# Patient Record
Sex: FEMALE | Race: OTHER | URBAN - METROPOLITAN AREA
[De-identification: names, ages, dates, MRNs, and addresses within clinical notes are randomized per-mention and may not be internally consistent; named-entity substitution may affect disease eponyms.]

---

## 2021-07-17 ENCOUNTER — INPATIENT (INPATIENT)
Facility: HOSPITAL | Age: 31
LOS: 3 days | Discharge: HOME HEALTH | End: 2021-07-21
Attending: HOSPITALIST | Admitting: HOSPITALIST
Payer: COMMERCIAL

## 2021-07-17 VITALS
SYSTOLIC BLOOD PRESSURE: 105 MMHG | OXYGEN SATURATION: 92 % | DIASTOLIC BLOOD PRESSURE: 57 MMHG | HEART RATE: 106 BPM | TEMPERATURE: 100 F | RESPIRATION RATE: 20 BRPM

## 2021-07-17 LAB
ALBUMIN SERPL ELPH-MCNC: 3.7 G/DL — SIGNIFICANT CHANGE UP (ref 3.5–5.2)
ALP SERPL-CCNC: 69 U/L — SIGNIFICANT CHANGE UP (ref 30–115)
ALT FLD-CCNC: 149 U/L — HIGH (ref 0–41)
ANION GAP SERPL CALC-SCNC: 16 MMOL/L — HIGH (ref 7–14)
AST SERPL-CCNC: 167 U/L — HIGH (ref 0–41)
BASE EXCESS BLDV CALC-SCNC: 5.4 MMOL/L — HIGH (ref -2–2)
BASOPHILS # BLD AUTO: 0.02 K/UL — SIGNIFICANT CHANGE UP (ref 0–0.2)
BASOPHILS NFR BLD AUTO: 0.2 % — SIGNIFICANT CHANGE UP (ref 0–1)
BILIRUB SERPL-MCNC: 0.4 MG/DL — SIGNIFICANT CHANGE UP (ref 0.2–1.2)
BUN SERPL-MCNC: 9 MG/DL — LOW (ref 10–20)
CA-I SERPL-SCNC: 1.15 MMOL/L — SIGNIFICANT CHANGE UP (ref 1.12–1.3)
CALCIUM SERPL-MCNC: 8.7 MG/DL — SIGNIFICANT CHANGE UP (ref 8.5–10.1)
CHLORIDE SERPL-SCNC: 94 MMOL/L — LOW (ref 98–110)
CO2 SERPL-SCNC: 23 MMOL/L — SIGNIFICANT CHANGE UP (ref 17–32)
CREAT SERPL-MCNC: 0.7 MG/DL — SIGNIFICANT CHANGE UP (ref 0.7–1.5)
D DIMER BLD IA.RAPID-MCNC: 436 NG/ML DDU — HIGH (ref 0–230)
EOSINOPHIL # BLD AUTO: 0 K/UL — SIGNIFICANT CHANGE UP (ref 0–0.7)
EOSINOPHIL NFR BLD AUTO: 0 % — SIGNIFICANT CHANGE UP (ref 0–8)
GAS PNL BLDV: 136 MMOL/L — SIGNIFICANT CHANGE UP (ref 136–145)
GAS PNL BLDV: SIGNIFICANT CHANGE UP
GLUCOSE SERPL-MCNC: 100 MG/DL — HIGH (ref 70–99)
HCO3 BLDV-SCNC: 30 MMOL/L — HIGH (ref 22–29)
HCT VFR BLD CALC: 38.2 % — SIGNIFICANT CHANGE UP (ref 37–47)
HCT VFR BLDA CALC: 37.9 % — SIGNIFICANT CHANGE UP (ref 34–44)
HGB BLD CALC-MCNC: 12.4 G/DL — LOW (ref 14–18)
HGB BLD-MCNC: 12.7 G/DL — SIGNIFICANT CHANGE UP (ref 12–16)
IMM GRANULOCYTES NFR BLD AUTO: 0.7 % — HIGH (ref 0.1–0.3)
LACTATE BLDV-MCNC: 1.3 MMOL/L — SIGNIFICANT CHANGE UP (ref 0.5–1.6)
LYMPHOCYTES # BLD AUTO: 1.04 K/UL — LOW (ref 1.2–3.4)
LYMPHOCYTES # BLD AUTO: 11.5 % — LOW (ref 20.5–51.1)
MCHC RBC-ENTMCNC: 28.2 PG — SIGNIFICANT CHANGE UP (ref 27–31)
MCHC RBC-ENTMCNC: 33.2 G/DL — SIGNIFICANT CHANGE UP (ref 32–37)
MCV RBC AUTO: 84.7 FL — SIGNIFICANT CHANGE UP (ref 81–99)
MONOCYTES # BLD AUTO: 0.6 K/UL — SIGNIFICANT CHANGE UP (ref 0.1–0.6)
MONOCYTES NFR BLD AUTO: 6.6 % — SIGNIFICANT CHANGE UP (ref 1.7–9.3)
NEUTROPHILS # BLD AUTO: 7.35 K/UL — HIGH (ref 1.4–6.5)
NEUTROPHILS NFR BLD AUTO: 81 % — HIGH (ref 42.2–75.2)
NRBC # BLD: 0 /100 WBCS — SIGNIFICANT CHANGE UP (ref 0–0)
PCO2 BLDV: 45 MMHG — SIGNIFICANT CHANGE UP (ref 41–51)
PH BLDV: 7.44 — HIGH (ref 7.26–7.43)
PLATELET # BLD AUTO: 275 K/UL — SIGNIFICANT CHANGE UP (ref 130–400)
PO2 BLDV: 20 MMHG — SIGNIFICANT CHANGE UP (ref 20–40)
POTASSIUM BLDV-SCNC: 4 MMOL/L — SIGNIFICANT CHANGE UP (ref 3.3–5.6)
POTASSIUM SERPL-MCNC: 4.5 MMOL/L — SIGNIFICANT CHANGE UP (ref 3.5–5)
POTASSIUM SERPL-SCNC: 4.5 MMOL/L — SIGNIFICANT CHANGE UP (ref 3.5–5)
PROT SERPL-MCNC: 6.6 G/DL — SIGNIFICANT CHANGE UP (ref 6–8)
RBC # BLD: 4.51 M/UL — SIGNIFICANT CHANGE UP (ref 4.2–5.4)
RBC # FLD: 13.6 % — SIGNIFICANT CHANGE UP (ref 11.5–14.5)
SAO2 % BLDV: 31 % — SIGNIFICANT CHANGE UP
SARS-COV-2 RNA SPEC QL NAA+PROBE: DETECTED
SODIUM SERPL-SCNC: 133 MMOL/L — LOW (ref 135–146)
TROPONIN T SERPL-MCNC: <0.01 NG/ML — SIGNIFICANT CHANGE UP
WBC # BLD: 9.07 K/UL — SIGNIFICANT CHANGE UP (ref 4.8–10.8)
WBC # FLD AUTO: 9.07 K/UL — SIGNIFICANT CHANGE UP (ref 4.8–10.8)

## 2021-07-17 PROCEDURE — 99406 BEHAV CHNG SMOKING 3-10 MIN: CPT

## 2021-07-17 PROCEDURE — 93010 ELECTROCARDIOGRAM REPORT: CPT

## 2021-07-17 PROCEDURE — 99285 EMERGENCY DEPT VISIT HI MDM: CPT

## 2021-07-17 PROCEDURE — 99223 1ST HOSP IP/OBS HIGH 75: CPT | Mod: 25

## 2021-07-17 PROCEDURE — 71045 X-RAY EXAM CHEST 1 VIEW: CPT | Mod: 26

## 2021-07-17 RX ORDER — SODIUM CHLORIDE 9 MG/ML
1000 INJECTION, SOLUTION INTRAVENOUS ONCE
Refills: 0 | Status: COMPLETED | OUTPATIENT
Start: 2021-07-17 | End: 2021-07-17

## 2021-07-17 RX ORDER — DEXAMETHASONE 0.5 MG/5ML
6 ELIXIR ORAL ONCE
Refills: 0 | Status: COMPLETED | OUTPATIENT
Start: 2021-07-17 | End: 2021-07-17

## 2021-07-17 RX ADMIN — Medication 6 MILLIGRAM(S): at 14:41

## 2021-07-17 RX ADMIN — SODIUM CHLORIDE 1000 MILLILITER(S): 9 INJECTION, SOLUTION INTRAVENOUS at 14:41

## 2021-07-17 NOTE — ED PROVIDER NOTE - ATTENDING CONTRIBUTION TO CARE
29 yo F with PMH presents to ED for worsening SOB. Pt states she started to feel ill on Sunday 7/12- cough, congestion and fever. she tested + COVID on Tuesday 7/13. She came to the ED today for worsening SOB. Pt not vaccinated for COVID.     Const: Well nourished, well developed, appears stated age  Eyes: PERRL, no conjunctival injection  HENT:  Neck supple without meningismus   CV: RRR, Warm, well-perfused extremities  RESP: CTA B/L, no tachypnea   GI: soft, non-tender, non-distended  MSK: No gross deformities appreciated  Skin: Warm, dry. No rashes  Neuro: Alert, CNs II-XII grossly intact. Sensation and motor function of extremities grossly intact.  Psych: Appropriate mood and affect.    Pt hypoxic to 90% on ambulation. Will do labs, CXR, and admit for hypoxia due to COVID

## 2021-07-17 NOTE — H&P ADULT - NSHPPHYSICALEXAM_GEN_ALL_CORE
CONSTITUTIONAL: No acute distress, well-developed, well-groomed, AAOx3  HEAD: Atraumatic, normocephalic  EYES: EOM intact, PERRLA, conjunctiva and sclera clear  ENT: Supple, no masses, no thyromegaly, no bruits, no JVD; moist mucous membranes  PULMONARY: Crackles b/l   CARDIOVASCULAR: Regular rate and rhythm; no murmurs, rubs, or gallops  GASTROINTESTINAL: Soft, non-tender, non-distended; bowel sounds present  MUSCULOSKELETAL: 2+ peripheral pulses; no clubbing, no cyanosis, no edema  NEUROLOGY: non-focal  SKIN: No rashes or lesions; warm and dry

## 2021-07-17 NOTE — H&P ADULT - HISTORY OF PRESENT ILLNESS
29 YO F with PMH of HTN, COVID-19 +ve (07/13/2021) who presents to the hospital with a c/c of SOB for the past x 6 days. She also reports associated fevers. Denies any runny nose, sore throat, rashes, CP, palpitations, LE swelling, N/V/D, ABD pain, and dysuria, sick contacts, recent travel. She did not get the COVID-19 Vaccine.  In the ED, Chest X-ray with b/l airspace opacities. Hypoxic to 90% on RA, placed on NC. COVID-19 PCR positive

## 2021-07-17 NOTE — H&P ADULT - NSHPLABSRESULTS_GEN_ALL_CORE
VITAL SIGNS: Last 24 Hours  T(C): 36.2 (17 Jul 2021 21:50), Max: 38.1 (17 Jul 2021 16:14)  T(F): 97.1 (17 Jul 2021 21:50), Max: 100.5 (17 Jul 2021 16:14)  HR: 86 (17 Jul 2021 21:50) (86 - 106)  BP: 106/70 (17 Jul 2021 21:50) (105/57 - 110/66)  BP(mean): --  RR: 18 (17 Jul 2021 21:50) (18 - 20)  SpO2: 95% (17 Jul 2021 22:28) (92% - 97%)    LABS:                        12.7   9.07  )-----------( 275      ( 17 Jul 2021 14:47 )             38.2     07-17    133<L>  |  94<L>  |  9<L>  ----------------------------<  100<H>  4.5   |  23  |  0.7    Ca    8.7      17 Jul 2021 14:47    TPro  6.6  /  Alb  3.7  /  TBili  0.4  /  DBili  x   /  AST  167<H>  /  ALT  149<H>  /  AlkPhos  69  07-17          Troponin T, Serum: <0.01 ng/mL (07-17-21 @ 14:47)      CARDIAC MARKERS ( 17 Jul 2021 14:47 )  x     / <0.01 ng/mL / x     / x     / x

## 2021-07-17 NOTE — ED PROVIDER NOTE - OBJECTIVE STATEMENT
30yF pmhx HTN c/o SOB x6days; constant, worsening, exacerbated by exertion; associated w/ cough, congestion, as well as fevers tmax 104; states she tested covid+ 4 days ago and sx have been getting worse. Pt is a smoker.

## 2021-07-17 NOTE — ED PROVIDER NOTE - CLINICAL SUMMARY MEDICAL DECISION MAKING FREE TEXT BOX
29 yo f presented to ED for SOB due COVID. pt hypoxic. given decadron in ED. Pt admitted for further management.

## 2021-07-17 NOTE — ED PROVIDER NOTE - NS ED ROS FT
Review of Systems:  	•	CONSTITUTIONAL - +fever, no diaphoresis  	•	SKIN - no rash, no lesions  	•	HEMATOLOGIC - no bleeding, no bruising  	•	EYES - no discharge, no injection  	•	ENT - no sore throat, +runny nose  	•	RESPIRATORY - +shortness of breath, +cough  	•	CARDIAC - no chest pain, no palpitations  	•	GI - no abd pain, no nausea, no vomiting, no diarrhea  	•	GENITO-URINARY - no dysuria, no hematuria  	•	MUSCULOSKELETAL - no joint pain, no muscle aches  	•	NEUROLOGIC - no dizziness, no headache

## 2021-07-17 NOTE — ED ADULT NURSE NOTE - NSIMPLEMENTINTERV_GEN_ALL_ED
Implemented All Universal Safety Interventions:  [place] to call system. Call bell, personal items and telephone within reach. Instruct patient to call for assistance. Room bathroom lighting operational. Non-slip footwear when patient is off stretcher. Physically safe environment: no spills, clutter or unnecessary equipment. Stretcher in lowest position, wheels locked, appropriate side rails in place.

## 2021-07-17 NOTE — H&P ADULT - ASSESSMENT
#Acute hypoxemic respiratory failure likely secondary to COVID-19 PNA  #Sepsis present on admission  - COVID-19 PCR positive. Continue isolation  - X-ray chest showing b/l airspace opacities  - Rule out bacterial PNA. Follow procalcitonin. Will hold off on the antibiotics for now. Low suspicion   - Dexamethasone 6mg IV QD for 10 days  - Remdesivir 200 mg x 1 and then 100 mg for 4 days  - ID evaluation for plasma. Will send type and screen and COVID-19 antibodies   - Follow inflammatory markers including ferritin and CRP. Repeat Q48  - D-dimer 436. Lovenox 40mg SubQ BID   - Monitor pulse Ox. Keep > 93%. Supplement as needed. Check pulse ox on ambulation   - Supportive care including antipyretics, antitussives and analgesics.    SOB + Fevers due to COVID19 pneumonia + acute hypoxic respiratory failure, sepsis present on admission (HR> 90 + fever + source + end organ [AHRF]). - recent travel. - COVID19 contact. Admit to [facility] isolation unit. Send Coags, CRP, procal, D-dimer, and LDH. Trend CBC, Ck, and LFTs. Send ferritin and fibrinogen. FU official Chest XR report. IV Steroids. Remdesivir/Plasma protocol. Plasma Abs. B/L LE duplex. APAP PRN. Anti-tussives PRN. Supplemental O2 PRN. Prone pt as tolerated. AC as per [facility] COVID protocol. ID Consult.     Lymphocytopenia and transaminitis, from above. Management as above.     Tobacco abuse with counseling. Pt extensively counseled on the benefits of smoking cessation and alternative therapies. Counseled for 15-20 minutes. Pt would like to think about their options prior to making a decision.             #Misc  - DVT Prophylaxis: Lovenox   - Diet: DASH/TLC  - GI Prophylaxis: Pantoprazole   - Activity: AAT  - Code Status: Full Code    Dispo: Continue to monitor  29 YO F with PMH of HTN, COVID-19 +ve (07/13/2021) who presents to the hospital with a c/c of SOB for the past x 6 days.    #Acute hypoxemic respiratory failure likely secondary to COVID-19 PNA  #Sepsis present on admission  - COVID-19 PCR positive. Continue isolation  - X-ray chest showing b/l airspace opacities  - Rule out bacterial PNA. Follow procalcitonin. Will hold off on the antibiotics for now. Low suspicion   - Dexamethasone 6mg IV QD for 10 days  - Remdesivir 200 mg x 1 and then 100 mg for 4 days  - ID evaluation for plasma. Will send type and screen and COVID-19 antibodies   - Follow inflammatory markers including ferritin and CRP. Repeat Q48  - D-dimer 436. Lovenox 40mg SubQ BID. LE Duplex   - Monitor pulse Ox. Keep > 93%. Supplement as needed. Check pulse ox on ambulation   - Supportive care including antipyretics, antitussives and analgesics.    #Active smoker  - Counselled on smoking cessation     #Misc  - DVT Prophylaxis: Lovenox   - Diet: DASH/TLC  - GI Prophylaxis: Pantoprazole   - Activity: AAT  - Code Status: Full Code    Dispo: Continue to monitor

## 2021-07-17 NOTE — H&P ADULT - ATTENDING COMMENTS
29 YO F with a PMH of HTN and COVID19+ (7/13/21) who presents to the hospital with a c/o SOB for the past x 6 days. Associated with fevers. Denies any runny nose, sore throat, rashes, CP, palpitations, LE swelling, N/V/D, ABD pain, and dysuria. - sick contacts. - recent travel. - COVID Vaccine.    In the ED, Chest X-ray with B/L airspace opacities (pending official read). Hypoxic to 90% on RA, placed on NC. Isolated and COVID19 swab was positive.     Physical exam shows pt in NAD. VSS, afebrile, not hypoxic on 3L NC. A&Ox3. Non-focal neuro exam. Muscle strength/sensation intact. CTA B/L with no W/C/R. RRR, no M/G/R. ABD is soft and non-tender, normoactive BSs. LEs without swelling. No rashes. Labs and radiology as above.     SOB + Fevers due to COVID19 pneumonia + acute hypoxic respiratory failure, sepsis present on admission (HR> 90 + fever + source + end organ [AHRF]). - recent travel. - COVID19 contact. Admit to COVID19 isolation unit. Send Coags, CRP, procal, D-dimer, and LDH. Trend CBC, Ck, and LFTs. Send ferritin and fibrinogen. FU official Chest XR report. IV Steroids. Remdesivir/Plasma protocol. Plasma Abs. B/L LE duplex. APAP PRN. Anti-tussives PRN. Supplemental O2 PRN. Prone pt as tolerated. AC as per [facility] COVID protocol. ID Consult.     Lymphocytopenia and transaminitis, from above. Management as above.     Hx of HTN. Restart home meds, except as stated above. DVT PPX. Inform PCP of pt's admission to hospital. My note supersedes the residents note. 29 YO F with a PMH of HTN and COVID19+ (7/13/21) who presents to the hospital with a c/o SOB for the past x 6 days. Associated with fevers. Denies any runny nose, sore throat, rashes, CP, palpitations, LE swelling, N/V/D, ABD pain, and dysuria. - sick contacts. - recent travel. - COVID Vaccine.    In the ED, Chest X-ray with B/L airspace opacities (pending official read). Hypoxic to 90% on RA, placed on NC. Isolated and COVID19 swab was positive.     Physical exam shows pt in NAD. VSS, afebrile, not hypoxic on 3L NC. A&Ox3. Non-focal neuro exam. Muscle strength/sensation intact. CTA B/L with no W/C/R. RRR, no M/G/R. ABD is soft and non-tender, normoactive BSs. LEs without swelling. No rashes. Labs and radiology as above.     SOB + Fevers due to COVID19 pneumonia + acute hypoxic respiratory failure, sepsis present on admission (HR> 90 + fever + source + end organ [AHRF]). - recent travel. - COVID19 contact. Admit to COVID19 isolation unit. Send Coags, CRP, procal, D-dimer, and LDH. Trend CBC, Ck, and LFTs. Send ferritin and fibrinogen. FU official Chest XR report. IV Steroids. Remdesivir/Plasma protocol. Plasma Abs. B/L LE duplex. APAP PRN. Anti-tussives PRN. Supplemental O2 PRN. Prone pt as tolerated. AC as per [facility] COVID protocol. ID Consult.     Lymphocytopenia and transaminitis, from above. Management as above.     Tobacco abuse with counseling. Pt extensively counseled on the benefits of smoking cessation and alternative therapies. Counseled for 15-20 minutes. Pt would like to think about their options prior to making a decision.     Hx of HTN. Restart home meds, except as stated above. DVT PPX. Inform PCP of pt's admission to hospital. My note supersedes the residents note.

## 2021-07-18 LAB
ALBUMIN SERPL ELPH-MCNC: 3.7 G/DL — SIGNIFICANT CHANGE UP (ref 3.5–5.2)
ALP SERPL-CCNC: 71 U/L — SIGNIFICANT CHANGE UP (ref 30–115)
ALT FLD-CCNC: 183 U/L — HIGH (ref 0–41)
ANION GAP SERPL CALC-SCNC: 11 MMOL/L — SIGNIFICANT CHANGE UP (ref 7–14)
AST SERPL-CCNC: 141 U/L — HIGH (ref 0–41)
BASOPHILS # BLD AUTO: 0 K/UL — SIGNIFICANT CHANGE UP (ref 0–0.2)
BASOPHILS NFR BLD AUTO: 0 % — SIGNIFICANT CHANGE UP (ref 0–1)
BILIRUB SERPL-MCNC: 0.3 MG/DL — SIGNIFICANT CHANGE UP (ref 0.2–1.2)
BLD GP AB SCN SERPL QL: SIGNIFICANT CHANGE UP
BUN SERPL-MCNC: 11 MG/DL — SIGNIFICANT CHANGE UP (ref 10–20)
CALCIUM SERPL-MCNC: 8.7 MG/DL — SIGNIFICANT CHANGE UP (ref 8.5–10.1)
CHLORIDE SERPL-SCNC: 99 MMOL/L — SIGNIFICANT CHANGE UP (ref 98–110)
CO2 SERPL-SCNC: 27 MMOL/L — SIGNIFICANT CHANGE UP (ref 17–32)
CREAT SERPL-MCNC: 0.5 MG/DL — LOW (ref 0.7–1.5)
CRP SERPL-MCNC: 194 MG/L — HIGH
EOSINOPHIL # BLD AUTO: 0 K/UL — SIGNIFICANT CHANGE UP (ref 0–0.7)
EOSINOPHIL NFR BLD AUTO: 0 % — SIGNIFICANT CHANGE UP (ref 0–8)
FERRITIN SERPL-MCNC: 1241 NG/ML — HIGH (ref 15–150)
GLUCOSE SERPL-MCNC: 119 MG/DL — HIGH (ref 70–99)
HCT VFR BLD CALC: 39 % — SIGNIFICANT CHANGE UP (ref 37–47)
HGB BLD-MCNC: 12.9 G/DL — SIGNIFICANT CHANGE UP (ref 12–16)
IMM GRANULOCYTES NFR BLD AUTO: 0.7 % — HIGH (ref 0.1–0.3)
LDH SERPL L TO P-CCNC: 566 — HIGH (ref 50–242)
LYMPHOCYTES # BLD AUTO: 0.61 K/UL — LOW (ref 1.2–3.4)
LYMPHOCYTES # BLD AUTO: 11.2 % — LOW (ref 20.5–51.1)
MAGNESIUM SERPL-MCNC: 2.5 MG/DL — HIGH (ref 1.8–2.4)
MCHC RBC-ENTMCNC: 28.3 PG — SIGNIFICANT CHANGE UP (ref 27–31)
MCHC RBC-ENTMCNC: 33.1 G/DL — SIGNIFICANT CHANGE UP (ref 32–37)
MCV RBC AUTO: 85.5 FL — SIGNIFICANT CHANGE UP (ref 81–99)
MONOCYTES # BLD AUTO: 0.46 K/UL — SIGNIFICANT CHANGE UP (ref 0.1–0.6)
MONOCYTES NFR BLD AUTO: 8.5 % — SIGNIFICANT CHANGE UP (ref 1.7–9.3)
NEUTROPHILS # BLD AUTO: 4.33 K/UL — SIGNIFICANT CHANGE UP (ref 1.4–6.5)
NEUTROPHILS NFR BLD AUTO: 79.6 % — HIGH (ref 42.2–75.2)
NRBC # BLD: 0 /100 WBCS — SIGNIFICANT CHANGE UP (ref 0–0)
PHOSPHATE SERPL-MCNC: 3.7 MG/DL — SIGNIFICANT CHANGE UP (ref 2.1–4.9)
PLATELET # BLD AUTO: 305 K/UL — SIGNIFICANT CHANGE UP (ref 130–400)
POTASSIUM SERPL-MCNC: 4.7 MMOL/L — SIGNIFICANT CHANGE UP (ref 3.5–5)
POTASSIUM SERPL-SCNC: 4.7 MMOL/L — SIGNIFICANT CHANGE UP (ref 3.5–5)
PROCALCITONIN SERPL-MCNC: 0.2 NG/ML — HIGH (ref 0.02–0.1)
PROT SERPL-MCNC: 6.6 G/DL — SIGNIFICANT CHANGE UP (ref 6–8)
RBC # BLD: 4.56 M/UL — SIGNIFICANT CHANGE UP (ref 4.2–5.4)
RBC # FLD: 13.9 % — SIGNIFICANT CHANGE UP (ref 11.5–14.5)
SODIUM SERPL-SCNC: 137 MMOL/L — SIGNIFICANT CHANGE UP (ref 135–146)
WBC # BLD: 5.44 K/UL — SIGNIFICANT CHANGE UP (ref 4.8–10.8)
WBC # FLD AUTO: 5.44 K/UL — SIGNIFICANT CHANGE UP (ref 4.8–10.8)

## 2021-07-18 PROCEDURE — 99233 SBSQ HOSP IP/OBS HIGH 50: CPT

## 2021-07-18 PROCEDURE — 93970 EXTREMITY STUDY: CPT | Mod: 26

## 2021-07-18 RX ORDER — REMDESIVIR 5 MG/ML
100 INJECTION INTRAVENOUS EVERY 24 HOURS
Refills: 0 | Status: DISCONTINUED | OUTPATIENT
Start: 2021-07-19 | End: 2021-07-21

## 2021-07-18 RX ORDER — DEXAMETHASONE 0.5 MG/5ML
6 ELIXIR ORAL DAILY
Refills: 0 | Status: DISCONTINUED | OUTPATIENT
Start: 2021-07-18 | End: 2021-07-21

## 2021-07-18 RX ORDER — REMDESIVIR 5 MG/ML
INJECTION INTRAVENOUS
Refills: 0 | Status: DISCONTINUED | OUTPATIENT
Start: 2021-07-18 | End: 2021-07-21

## 2021-07-18 RX ORDER — REMDESIVIR 5 MG/ML
200 INJECTION INTRAVENOUS EVERY 24 HOURS
Refills: 0 | Status: COMPLETED | OUTPATIENT
Start: 2021-07-18 | End: 2021-07-18

## 2021-07-18 RX ORDER — PANTOPRAZOLE SODIUM 20 MG/1
40 TABLET, DELAYED RELEASE ORAL
Refills: 0 | Status: DISCONTINUED | OUTPATIENT
Start: 2021-07-18 | End: 2021-07-21

## 2021-07-18 RX ORDER — ENOXAPARIN SODIUM 100 MG/ML
40 INJECTION SUBCUTANEOUS
Refills: 0 | Status: DISCONTINUED | OUTPATIENT
Start: 2021-07-18 | End: 2021-07-21

## 2021-07-18 RX ORDER — ACETAMINOPHEN 500 MG
650 TABLET ORAL EVERY 6 HOURS
Refills: 0 | Status: DISCONTINUED | OUTPATIENT
Start: 2021-07-18 | End: 2021-07-21

## 2021-07-18 RX ADMIN — REMDESIVIR 200 MILLIGRAM(S): 5 INJECTION INTRAVENOUS at 17:52

## 2021-07-18 RX ADMIN — Medication 6 MILLIGRAM(S): at 05:44

## 2021-07-18 RX ADMIN — ENOXAPARIN SODIUM 40 MILLIGRAM(S): 100 INJECTION SUBCUTANEOUS at 17:52

## 2021-07-18 NOTE — CONSULT NOTE ADULT - ASSESSMENT
31 YO F with PMH of HTN, COVID-19 +ve (07/13/2021) who presents to the hospital with a c/c of SOB for the past x 6 days. She also reports associated fevers. Denies any runny nose, sore throat, rashes, CP, palpitations, LE swelling, N/V/D, ABD pain, and dysuria, sick contacts, recent travel. She did not get the COVID-19 Vaccine.    IMPRESSION;  COVID 19 with severe illness. SpO2 < 94% on RA and need for supplemental O2.  Pt is in the early viral replicative phase based on the timeline/onset of symptoms.  Inflammatory markers are elevated and suggestive of a cytokine response/cytokine storm.  procalcitonin 0.20  , not suggestive of a bacterial PNA.  Ferritin 1241    Ddimers 436    RECOMMENDATIONS;  No role for plasma /Toci  Target SpO2 92 % to 96 %  Day 1 : Single  mg IV loading dose  Day 2-5 : single  mg IV once daily maintenance dose  Daily CBC,CMP.  Dexamethasone 6 mg iv q24h for 10 days.  Monitor for side effects: hyperglycemia, neurological ( agitation/confusion), adrenal suppression, bacterial and fungal infections  Anticoagulation as per team.

## 2021-07-18 NOTE — CONSULT NOTE ADULT - SUBJECTIVE AND OBJECTIVE BOX
CRISSY BARKLEY  30y, Female  Allergy: No Known Allergies      All historical available data reviewed.    HPI:  29 YO F with PMH of HTN, COVID-19 +ve (07/13/2021) who presents to the hospital with a c/c of SOB for the past x 6 days. She also reports associated fevers. Denies any runny nose, sore throat, rashes, CP, palpitations, LE swelling, N/V/D, ABD pain, and dysuria, sick contacts, recent travel. She did not get the COVID-19 Vaccine.  In the ED, Chest X-ray with b/l airspace opacities. Hypoxic to 90% on RA, placed on NC. COVID-19 PCR positive         (17 Jul 2021 21:04)    FAMILY HISTORY:    PAST MEDICAL & SURGICAL HISTORY:  HTN (hypertension)    No significant past surgical history          VITALS:  T(F): 97.2, Max: 100.5 (07-17-21 @ 16:14)  HR: 72  BP: 105/63  RR: 18Vital Signs Last 24 Hrs  T(C): 36.2 (18 Jul 2021 05:26), Max: 38.1 (17 Jul 2021 16:14)  T(F): 97.2 (18 Jul 2021 05:26), Max: 100.5 (17 Jul 2021 16:14)  HR: 72 (18 Jul 2021 05:26) (72 - 106)  BP: 105/63 (18 Jul 2021 05:26) (105/57 - 110/66)  BP(mean): --  RR: 18 (18 Jul 2021 05:26) (18 - 20)  SpO2: 100% (18 Jul 2021 05:26) (92% - 100%)    TESTS & MEASUREMENTS:                        12.7   9.07  )-----------( 275      ( 17 Jul 2021 14:47 )             38.2     07-17    133<L>  |  94<L>  |  9<L>  ----------------------------<  100<H>  4.5   |  23  |  0.7    Ca    8.7      17 Jul 2021 14:47    TPro  6.6  /  Alb  3.7  /  TBili  0.4  /  DBili  x   /  AST  167<H>  /  ALT  149<H>  /  AlkPhos  69  07-17    LIVER FUNCTIONS - ( 17 Jul 2021 14:47 )  Alb: 3.7 g/dL / Pro: 6.6 g/dL / ALK PHOS: 69 U/L / ALT: 149 U/L / AST: 167 U/L / GGT: x                   RADIOLOGY & ADDITIONAL TESTS:  Personal review of radiological diagnostics performed  Echo and EKG results noted when applicable.     MEDICATIONS:  acetaminophen   Tablet .. 650 milliGRAM(s) Oral every 6 hours PRN  dexAMETHasone  Injectable 6 milliGRAM(s) IV Push daily      ANTIBIOTICS:

## 2021-07-19 LAB
ALBUMIN SERPL ELPH-MCNC: 3.4 G/DL — LOW (ref 3.5–5.2)
ALBUMIN SERPL ELPH-MCNC: 3.5 G/DL — SIGNIFICANT CHANGE UP (ref 3.5–5.2)
ALP SERPL-CCNC: 69 U/L — SIGNIFICANT CHANGE UP (ref 30–115)
ALP SERPL-CCNC: 70 U/L — SIGNIFICANT CHANGE UP (ref 30–115)
ALT FLD-CCNC: 178 U/L — HIGH (ref 0–41)
ALT FLD-CCNC: 181 U/L — HIGH (ref 0–41)
AST SERPL-CCNC: 81 U/L — HIGH (ref 0–41)
AST SERPL-CCNC: 82 U/L — HIGH (ref 0–41)
BILIRUB DIRECT SERPL-MCNC: <0.2 MG/DL — SIGNIFICANT CHANGE UP (ref 0–0.2)
BILIRUB DIRECT SERPL-MCNC: <0.2 MG/DL — SIGNIFICANT CHANGE UP (ref 0–0.2)
BILIRUB INDIRECT FLD-MCNC: >0.1 MG/DL — LOW (ref 0.2–1.2)
BILIRUB INDIRECT FLD-MCNC: >0.1 MG/DL — LOW (ref 0.2–1.2)
BILIRUB SERPL-MCNC: 0.3 MG/DL — SIGNIFICANT CHANGE UP (ref 0.2–1.2)
BILIRUB SERPL-MCNC: 0.3 MG/DL — SIGNIFICANT CHANGE UP (ref 0.2–1.2)
COVID-19 SPIKE DOMAIN AB INTERP: POSITIVE
COVID-19 SPIKE DOMAIN ANTIBODY RESULT: 5 U/ML — HIGH
CREAT SERPL-MCNC: 0.5 MG/DL — LOW (ref 0.7–1.5)
CREAT SERPL-MCNC: 0.6 MG/DL — LOW (ref 0.7–1.5)
CRP SERPL-MCNC: 160 MG/L — HIGH
FERRITIN SERPL-MCNC: 1158 NG/ML — HIGH (ref 15–150)
HCT VFR BLD CALC: 39.9 % — SIGNIFICANT CHANGE UP (ref 37–47)
HGB BLD-MCNC: 12.8 G/DL — SIGNIFICANT CHANGE UP (ref 12–16)
INR BLD: 1.18 RATIO — SIGNIFICANT CHANGE UP (ref 0.65–1.3)
MCHC RBC-ENTMCNC: 27.7 PG — SIGNIFICANT CHANGE UP (ref 27–31)
MCHC RBC-ENTMCNC: 32.1 G/DL — SIGNIFICANT CHANGE UP (ref 32–37)
MCV RBC AUTO: 86.4 FL — SIGNIFICANT CHANGE UP (ref 81–99)
NRBC # BLD: 0 /100 WBCS — SIGNIFICANT CHANGE UP (ref 0–0)
PLATELET # BLD AUTO: 443 K/UL — HIGH (ref 130–400)
PROCALCITONIN SERPL-MCNC: 0.11 NG/ML — HIGH (ref 0.02–0.1)
PROT SERPL-MCNC: 6.4 G/DL — SIGNIFICANT CHANGE UP (ref 6–8)
PROT SERPL-MCNC: 6.4 G/DL — SIGNIFICANT CHANGE UP (ref 6–8)
PROTHROM AB SERPL-ACNC: 13.6 SEC — HIGH (ref 9.95–12.87)
RBC # BLD: 4.62 M/UL — SIGNIFICANT CHANGE UP (ref 4.2–5.4)
RBC # FLD: 13.7 % — SIGNIFICANT CHANGE UP (ref 11.5–14.5)
SARS-COV-2 IGG+IGM SERPL QL IA: 5 U/ML — HIGH
SARS-COV-2 IGG+IGM SERPL QL IA: POSITIVE
WBC # BLD: 8.52 K/UL — SIGNIFICANT CHANGE UP (ref 4.8–10.8)
WBC # FLD AUTO: 8.52 K/UL — SIGNIFICANT CHANGE UP (ref 4.8–10.8)

## 2021-07-19 PROCEDURE — 99233 SBSQ HOSP IP/OBS HIGH 50: CPT

## 2021-07-19 RX ADMIN — ENOXAPARIN SODIUM 40 MILLIGRAM(S): 100 INJECTION SUBCUTANEOUS at 05:08

## 2021-07-19 RX ADMIN — PANTOPRAZOLE SODIUM 40 MILLIGRAM(S): 20 TABLET, DELAYED RELEASE ORAL at 05:09

## 2021-07-19 RX ADMIN — Medication 6 MILLIGRAM(S): at 05:09

## 2021-07-19 RX ADMIN — ENOXAPARIN SODIUM 40 MILLIGRAM(S): 100 INJECTION SUBCUTANEOUS at 17:14

## 2021-07-19 RX ADMIN — Medication 650 MILLIGRAM(S): at 05:51

## 2021-07-19 RX ADMIN — REMDESIVIR 500 MILLIGRAM(S): 5 INJECTION INTRAVENOUS at 16:34

## 2021-07-19 RX ADMIN — Medication 650 MILLIGRAM(S): at 05:21

## 2021-07-19 NOTE — PROGRESS NOTE ADULT - ATTENDING COMMENTS
repeat inflammatory markers in AM  continue RDV and steroids  wean o2 as tolerated # severe COVID pneumonia  repeat inflammatory markers in AM  continue RDV and steroids  wean o2 as tolerated    # transaminitis due to COVId infection  monitor levels

## 2021-07-19 NOTE — PROGRESS NOTE ADULT - SUBJECTIVE AND OBJECTIVE BOX
CRISSY BARKLEY 30y Female  MRN#: [id number]   CODE STATUS:________    Hospital Day: 2d    Pt is currently admitted with the primary diagnosis of     SUBJECTIVE  Hospital Course    Overnight events     Subjective complaints     Present Today:   - Tyrone:  No [  ], Yes [   ] : Indication:     - Type of IV Access:       .. CVC/Piccline:  No [  ], Yes [   ] : Indication:       .. Midline: No [  ], Yes [   ] : Indication:                                             ----------------------------------------------------------  OBJECTIVE  PAST MEDICAL & SURGICAL HISTORY  HTN (hypertension)    No significant past surgical history                                              -----------------------------------------------------------  ALLERGIES:  No Known Allergies                                            ------------------------------------------------------------    HOME MEDICATIONS  Home Medications:                           MEDICATIONS:  STANDING MEDICATIONS  dexAMETHasone  Injectable 6 milliGRAM(s) IV Push daily  enoxaparin Injectable 40 milliGRAM(s) SubCutaneous two times a day  pantoprazole    Tablet 40 milliGRAM(s) Oral before breakfast  remdesivir  IVPB 100 milliGRAM(s) IV Intermittent every 24 hours  remdesivir  IVPB   IV Intermittent     PRN MEDICATIONS  acetaminophen   Tablet .. 650 milliGRAM(s) Oral every 6 hours PRN                                            ------------------------------------------------------------  VITAL SIGNS: Last 24 Hours  T(C): 35.6 (19 Jul 2021 05:26), Max: 35.9 (18 Jul 2021 12:40)  T(F): 96 (19 Jul 2021 05:26), Max: 96.7 (18 Jul 2021 12:40)  HR: 67 (19 Jul 2021 05:26) (67 - 86)  BP: 93/54 (19 Jul 2021 05:26) (93/54 - 103/68)  BP(mean): --  RR: 18 (19 Jul 2021 05:39) (16 - 18)  SpO2: 92% (19 Jul 2021 08:24) (88% - 92%)                                             --------------------------------------------------------------  LABS:                        12.9   5.44  )-----------( 305      ( 18 Jul 2021 07:48 )             39.0     07-18    137  |  99  |  11  ----------------------------<  119<H>  4.7   |  27  |  0.5<L>    Ca    8.7      18 Jul 2021 07:48  Phos  3.7     07-18  Mg     2.5     07-18    TPro  6.6  /  Alb  3.7  /  TBili  0.3  /  DBili  x   /  AST  141<H>  /  ALT  183<H>  /  AlkPhos  71  07-18                  CARDIAC MARKERS ( 17 Jul 2021 14:47 )  x     / <0.01 ng/mL / x     / x     / x                                                  -------------------------------------------------------------  RADIOLOGY:                                            --------------------------------------------------------------    PHYSICAL EXAM:  General:   HEENT:  LUNGS:  HEART:  ABDOMEN:  EXT:  NEURO:  SKIN:                                           --------------------------------------------------------------    ASSESSMENT & PLAN    Past medical history and hospital course                                                                                                           ----------------------------------------------------  # DVT prophylaxis     # GI prophylaxis     # Diet     # Activity Score (AM-PAC)    # Code status     # Disposition                                                                              --------------------------------------------------------    # Handoff      CRISSY BARKLEY 30y Female  MRN#: [id number]   CODE STATUS: Full    Hospital Day: 2d    Pt is currently admitted with the primary diagnosis of COVID-19 hypoxia.    SUBJECTIVE  Hospital Course:     Overnight events: No events reported, patient reports feeling much better, would like to go home.    Subjective complaints: Endorses SOB, cough, denies chest pain, fever, chills.    Present Today:   - Hansen:  No [ x ], Yes [   ] : Indication:     - Type of IV Access:       .. CVC/Piccline:  No [ x ], Yes [   ] : Indication:       .. Midline: No [ x ], Yes [   ] : Indication:                                             ----------------------------------------------------------  OBJECTIVE  PAST MEDICAL & SURGICAL HISTORY  HTN (hypertension)    No significant past surgical history                                              -----------------------------------------------------------  ALLERGIES:  No Known Allergies                                            ------------------------------------------------------------    HOME MEDICATIONS  Home Medications:                           MEDICATIONS:  STANDING MEDICATIONS  dexAMETHasone  Injectable 6 milliGRAM(s) IV Push daily  enoxaparin Injectable 40 milliGRAM(s) SubCutaneous two times a day  pantoprazole    Tablet 40 milliGRAM(s) Oral before breakfast  remdesivir  IVPB 100 milliGRAM(s) IV Intermittent every 24 hours  remdesivir  IVPB   IV Intermittent     PRN MEDICATIONS  acetaminophen   Tablet .. 650 milliGRAM(s) Oral every 6 hours PRN                                            ------------------------------------------------------------  VITAL SIGNS: Last 24 Hours  T(C): 35.6 (19 Jul 2021 05:26), Max: 35.9 (18 Jul 2021 12:40)  T(F): 96 (19 Jul 2021 05:26), Max: 96.7 (18 Jul 2021 12:40)  HR: 67 (19 Jul 2021 05:26) (67 - 86)  BP: 93/54 (19 Jul 2021 05:26) (93/54 - 103/68)  BP(mean): --  RR: 18 (19 Jul 2021 05:39) (16 - 18)  SpO2: 92% (19 Jul 2021 08:24) (88% - 92%)                                             --------------------------------------------------------------  LABS:                        12.9   5.44  )-----------( 305      ( 18 Jul 2021 07:48 )             39.0     07-18    137  |  99  |  11  ----------------------------<  119<H>  4.7   |  27  |  0.5<L>    Ca    8.7      18 Jul 2021 07:48  Phos  3.7     07-18  Mg     2.5     07-18    TPro  6.6  /  Alb  3.7  /  TBili  0.3  /  DBili  x   /  AST  141<H>  /  ALT  183<H>  /  AlkPhos  71  07-18                  CARDIAC MARKERS ( 17 Jul 2021 14:47 )  x     / <0.01 ng/mL / x     / x     / x                                                  -------------------------------------------------------------  RADIOLOGY:                                            --------------------------------------------------------------    PHYSICAL EXAM:  General: resting comfortably, no acute distress  HEENT: EOMI, atraumatic  LUNGS: clear to auscultation bilaterally, normal respiratory rate  HEART: regular rate/rhythm, no murmurs/gallops  ABDOMEN: soft, nontender, normoactive bowel sounds  EXT: 5/5 in upper/lower extremities bilaterally  NEURO: aaox3, no focal deficits noted  SKIN: intact, no new lesions noted.                                           --------------------------------------------------------------    ASSESSMENT & PLAN    Past medical history and hospital course                                                                                                           ----------------------------------------------------  # DVT prophylaxis     # GI prophylaxis     # Diet     # Activity Score (AM-PAC)    # Code status     # Disposition                                                                              --------------------------------------------------------    # Handoff      CRISSY BARKLEY 30y Female  MRN#: [id number]   CODE STATUS: Full    Hospital Day: 2d    Pt is currently admitted with the primary diagnosis of COVID-19 hypoxia.    SUBJECTIVE  Hospital Course:     Overnight events: No events reported, patient reports feeling much better, would like to go home.    Subjective complaints: Endorses SOB, cough, denies chest pain, fever, chills.    Present Today:   - Hansen:  No [ x ], Yes [   ] : Indication:     - Type of IV Access:       .. CVC/Piccline:  No [ x ], Yes [   ] : Indication:       .. Midline: No [ x ], Yes [   ] : Indication:                                             ----------------------------------------------------------  OBJECTIVE  PAST MEDICAL & SURGICAL HISTORY  HTN (hypertension)    No significant past surgical history                                              -----------------------------------------------------------  ALLERGIES:  No Known Allergies                                            ------------------------------------------------------------    HOME MEDICATIONS  Home Medications:                           MEDICATIONS:  STANDING MEDICATIONS  dexAMETHasone  Injectable 6 milliGRAM(s) IV Push daily  enoxaparin Injectable 40 milliGRAM(s) SubCutaneous two times a day  pantoprazole    Tablet 40 milliGRAM(s) Oral before breakfast  remdesivir  IVPB 100 milliGRAM(s) IV Intermittent every 24 hours  remdesivir  IVPB   IV Intermittent     PRN MEDICATIONS  acetaminophen   Tablet .. 650 milliGRAM(s) Oral every 6 hours PRN                                            ------------------------------------------------------------  VITAL SIGNS: Last 24 Hours  T(C): 35.6 (19 Jul 2021 05:26), Max: 35.9 (18 Jul 2021 12:40)  T(F): 96 (19 Jul 2021 05:26), Max: 96.7 (18 Jul 2021 12:40)  HR: 67 (19 Jul 2021 05:26) (67 - 86)  BP: 93/54 (19 Jul 2021 05:26) (93/54 - 103/68)  BP(mean): --  RR: 18 (19 Jul 2021 05:39) (16 - 18)  SpO2: 92% (19 Jul 2021 08:24) (88% - 92%)                                             --------------------------------------------------------------  LABS:                        12.9   5.44  )-----------( 305      ( 18 Jul 2021 07:48 )             39.0     07-18    137  |  99  |  11  ----------------------------<  119<H>  4.7   |  27  |  0.5<L>    Ca    8.7      18 Jul 2021 07:48  Phos  3.7     07-18  Mg     2.5     07-18    TPro  6.6  /  Alb  3.7  /  TBili  0.3  /  DBili  x   /  AST  141<H>  /  ALT  183<H>  /  AlkPhos  71  07-18                  CARDIAC MARKERS ( 17 Jul 2021 14:47 )  x     / <0.01 ng/mL / x     / x     / x                                                  -------------------------------------------------------------  RADIOLOGY:                                            --------------------------------------------------------------    PHYSICAL EXAM:  General: resting comfortably, no acute distress  HEENT: EOMI, atraumatic  LUNGS: clear to auscultation bilaterally, normal respiratory rate  HEART: regular rate/rhythm, no murmurs/gallops  ABDOMEN: soft, nontender, normoactive bowel sounds  EXT: 5/5 in upper/lower extremities bilaterally  NEURO: aaox3, no focal deficits noted  SKIN: intact, no new lesions noted.                                           --------------------------------------------------------------    ASSESSMENT & PLAN  Past medical history and hospital course   #Acute hypoxemic respiratory failure likely secondary to COVID-19 PNA  - patient reports improvement in sx over weekend  - IV remdesivir 100mg qdaily for 4 doses, starting 7/19  - Dexamethasone IV push 6mg qdaily, 7d, starting 7/19  - Tylenol PRN  - 4L O2 NC, satting 92-93  - ambulating w/o difficulty currently  - Ferritin 1241, , Ddimers 436  - monitor hepatic, renal function, coag panel    #Active smoker  -  on cessation, f/u with PCP outpatient    # obesity; BMI >30  -  on weight loss, f/u with PCP outpatient                                                                              ----------------------------------------------------  # DVT prophylaxis: Lovenox BID (BMI >30)    # GI prophylaxis: Protonix    # Diet    # Activity Score: AAT    # Code status: Full    # Disposition: Remain on Floor                                                                           --------------------------------------------------------    # Handoff: Monitor sx, continue remdesivir/steroids, ID following, wean off O2 as tolerated, encourage ambulation as tolerated.     CRISSY BARKLEY 30y Female  MRN#: [id number]   CODE STATUS: Full    Hospital Day: 2d    Pt is currently admitted with the primary diagnosis of COVID-19 hypoxia.    SUBJECTIVE  Hospital Course:     Overnight events: No events reported, patient reports feeling much better, would like to go home.    Subjective complaints: Endorses SOB, cough, denies chest pain, fever, chills.    Present Today:   - Hansen:  No [ x ], Yes [   ] : Indication:     - Type of IV Access:       .. CVC/Piccline:  No [ x ], Yes [   ] : Indication:       .. Midline: No [ x ], Yes [   ] : Indication:                                             ----------------------------------------------------------  OBJECTIVE  PAST MEDICAL & SURGICAL HISTORY  HTN (hypertension)    No significant past surgical history                                              -----------------------------------------------------------  ALLERGIES:  No Known Allergies                                            ------------------------------------------------------------    HOME MEDICATIONS  Home Medications:                           MEDICATIONS:  STANDING MEDICATIONS  dexAMETHasone  Injectable 6 milliGRAM(s) IV Push daily  enoxaparin Injectable 40 milliGRAM(s) SubCutaneous two times a day  pantoprazole    Tablet 40 milliGRAM(s) Oral before breakfast  remdesivir  IVPB 100 milliGRAM(s) IV Intermittent every 24 hours  remdesivir  IVPB   IV Intermittent     PRN MEDICATIONS  acetaminophen   Tablet .. 650 milliGRAM(s) Oral every 6 hours PRN                                            ------------------------------------------------------------  VITAL SIGNS: Last 24 Hours  T(C): 35.6 (19 Jul 2021 05:26), Max: 35.9 (18 Jul 2021 12:40)  T(F): 96 (19 Jul 2021 05:26), Max: 96.7 (18 Jul 2021 12:40)  HR: 67 (19 Jul 2021 05:26) (67 - 86)  BP: 93/54 (19 Jul 2021 05:26) (93/54 - 103/68)  BP(mean): --  RR: 18 (19 Jul 2021 05:39) (16 - 18)  SpO2: 92% (19 Jul 2021 08:24) (88% - 92%)                                             --------------------------------------------------------------  LABS:                        12.9   5.44  )-----------( 305      ( 18 Jul 2021 07:48 )             39.0     07-18    137  |  99  |  11  ----------------------------<  119<H>  4.7   |  27  |  0.5<L>    Ca    8.7      18 Jul 2021 07:48  Phos  3.7     07-18  Mg     2.5     07-18    TPro  6.6  /  Alb  3.7  /  TBili  0.3  /  DBili  x   /  AST  141<H>  /  ALT  183<H>  /  AlkPhos  71  07-18                  CARDIAC MARKERS ( 17 Jul 2021 14:47 )  x     / <0.01 ng/mL / x     / x     / x                                                  -------------------------------------------------------------  RADIOLOGY:                                            --------------------------------------------------------------    PHYSICAL EXAM:  General: resting comfortably, no acute distress  HEENT: EOMI, atraumatic  LUNGS: clear to auscultation bilaterally, normal respiratory rate  HEART: regular rate/rhythm, no murmurs/gallops  ABDOMEN: soft, nontender, normoactive bowel sounds  EXT: 5/5 in upper/lower extremities bilaterally  NEURO: aaox3, no focal deficits noted  SKIN: intact, no new lesions noted.                                           --------------------------------------------------------------    ASSESSMENT & PLAN  Past medical history and hospital course   #Acute hypoxemic respiratory failure likely secondary to COVID-19 PNA  - patient reports improvement in sx over weekend  - IV remdesivir 100mg qdaily for 4 doses, starting 7/19  - Dexamethasone IV push 6mg qdaily, 7d, starting 7/19  - Tylenol PRN  - 4L O2 NC, satting 92-93  - ambulating w/o difficulty currently  - Ferritin 1241, , Ddimers 436  - monitor hepatic, renal function, coag panel    #Active smoker  -  on cessation, f/u with PCP outpatient    # obesity; BMI >30  -  on weight loss, f/u with PCP outpatient                                                                              ----------------------------------------------------  # DVT prophylaxis: Lovenox BID (BMI >30)    # GI prophylaxis: Protonix    # Diet: DASH/TLC    # Activity Score: AAT    # Code status: Full    # Disposition: Remain on Floor                                                                           --------------------------------------------------------    # Handoff: Monitor sx, continue remdesivir/steroids, ID following, wean off O2 as tolerated, encourage ambulation as tolerated.     CRISSY BARKLEY 30y Female  MRN#: [id number]   CODE STATUS: Full    Hospital Day: 2d    Pt is currently admitted with the primary diagnosis of COVID-19 hypoxia.    SUBJECTIVE  Hospital Course:     Overnight events: No events reported, patient reports feeling much better, would like to go home.    Subjective complaints: Endorses SOB, cough, denies chest pain, fever, chills.    Present Today:   - Hansen:  No [ x ], Yes [   ] : Indication:     - Type of IV Access:       .. CVC/Piccline:  No [ x ], Yes [   ] : Indication:       .. Midline: No [ x ], Yes [   ] : Indication:                                             ----------------------------------------------------------  OBJECTIVE  PAST MEDICAL & SURGICAL HISTORY  HTN (hypertension)    No significant past surgical history                                              -----------------------------------------------------------  ALLERGIES:  No Known Allergies                                            ------------------------------------------------------------    HOME MEDICATIONS  Home Medications:                           MEDICATIONS:  STANDING MEDICATIONS  dexAMETHasone  Injectable 6 milliGRAM(s) IV Push daily  enoxaparin Injectable 40 milliGRAM(s) SubCutaneous two times a day  pantoprazole    Tablet 40 milliGRAM(s) Oral before breakfast  remdesivir  IVPB 100 milliGRAM(s) IV Intermittent every 24 hours  remdesivir  IVPB   IV Intermittent     PRN MEDICATIONS  acetaminophen   Tablet .. 650 milliGRAM(s) Oral every 6 hours PRN                                            ------------------------------------------------------------  VITAL SIGNS: Last 24 Hours  T(C): 35.6 (19 Jul 2021 05:26), Max: 35.9 (18 Jul 2021 12:40)  T(F): 96 (19 Jul 2021 05:26), Max: 96.7 (18 Jul 2021 12:40)  HR: 67 (19 Jul 2021 05:26) (67 - 86)  BP: 93/54 (19 Jul 2021 05:26) (93/54 - 103/68)  BP(mean): --  RR: 18 (19 Jul 2021 05:39) (16 - 18)  SpO2: 92% (19 Jul 2021 08:24) (88% - 92%)                                             --------------------------------------------------------------  LABS:                        12.9   5.44  )-----------( 305      ( 18 Jul 2021 07:48 )             39.0     07-18    137  |  99  |  11  ----------------------------<  119<H>  4.7   |  27  |  0.5<L>    Ca    8.7      18 Jul 2021 07:48  Phos  3.7     07-18  Mg     2.5     07-18    TPro  6.6  /  Alb  3.7  /  TBili  0.3  /  DBili  x   /  AST  141<H>  /  ALT  183<H>  /  AlkPhos  71  07-18                  CARDIAC MARKERS ( 17 Jul 2021 14:47 )  x     / <0.01 ng/mL / x     / x     / x                                                  -------------------------------------------------------------  RADIOLOGY:                                            --------------------------------------------------------------    PHYSICAL EXAM:  General: resting comfortably, no acute distress  HEENT: EOMI, atraumatic  LUNGS: clear to auscultation bilaterally, normal respiratory rate  HEART: regular rate/rhythm, no murmurs/gallops  ABDOMEN: soft, nontender, normoactive bowel sounds  EXT: 5/5 in upper/lower extremities bilaterally  NEURO: aaox3, no focal deficits noted  SKIN: intact, no new lesions noted.                                           --------------------------------------------------------------    ASSESSMENT & PLAN  Past medical history and hospital course   #Acute hypoxemic respiratory failure likely secondary to COVID-19 PNA  - patient reports improvement in sx over weekend  - IV remdesivir 100mg qdaily for 4 doses, starting 7/19  - Dexamethasone IV push 6mg qdaily, 7d, starting 7/19  - Tylenol PRN  - remains on 4L O2 NC, satting 92-93  - ambulating w/o difficulty currently  - Ferritin 1241, , Ddimers 436 on 7/18  - monitor hepatic, renal function, coag panel    #Active smoker  -  on cessation, f/u with PCP outpatient    # obesity; BMI >30  -  on weight loss, f/u with PCP outpatient                                                                              ----------------------------------------------------  # DVT prophylaxis: Lovenox BID (BMI >30)    # GI prophylaxis: Protonix    # Diet: DASH/TLC    # Activity Score: AAT    # Code status: Full    # Disposition: Remain on Floor                                                                           --------------------------------------------------------    # Handoff: Monitor sx, continue remdesivir/steroids, ID following, wean off O2 as tolerated, encourage ambulation as tolerated.

## 2021-07-20 LAB
ALBUMIN SERPL ELPH-MCNC: 3.4 G/DL — LOW (ref 3.5–5.2)
ALP SERPL-CCNC: 71 U/L — SIGNIFICANT CHANGE UP (ref 30–115)
ALT FLD-CCNC: 129 U/L — HIGH (ref 0–41)
ANION GAP SERPL CALC-SCNC: 12 MMOL/L — SIGNIFICANT CHANGE UP (ref 7–14)
APTT BLD: 30 SEC — SIGNIFICANT CHANGE UP (ref 27–39.2)
AST SERPL-CCNC: 38 U/L — SIGNIFICANT CHANGE UP (ref 0–41)
BASOPHILS # BLD AUTO: 0.01 K/UL — SIGNIFICANT CHANGE UP (ref 0–0.2)
BASOPHILS NFR BLD AUTO: 0.1 % — SIGNIFICANT CHANGE UP (ref 0–1)
BILIRUB SERPL-MCNC: 0.3 MG/DL — SIGNIFICANT CHANGE UP (ref 0.2–1.2)
BUN SERPL-MCNC: 14 MG/DL — SIGNIFICANT CHANGE UP (ref 10–20)
CALCIUM SERPL-MCNC: 8.6 MG/DL — SIGNIFICANT CHANGE UP (ref 8.5–10.1)
CHLORIDE SERPL-SCNC: 102 MMOL/L — SIGNIFICANT CHANGE UP (ref 98–110)
CO2 SERPL-SCNC: 25 MMOL/L — SIGNIFICANT CHANGE UP (ref 17–32)
CREAT SERPL-MCNC: 0.6 MG/DL — LOW (ref 0.7–1.5)
D DIMER BLD IA.RAPID-MCNC: 177 NG/ML DDU — SIGNIFICANT CHANGE UP (ref 0–230)
D DIMER BLD IA.RAPID-MCNC: 216 NG/ML DDU — SIGNIFICANT CHANGE UP (ref 0–230)
EOSINOPHIL # BLD AUTO: 0 K/UL — SIGNIFICANT CHANGE UP (ref 0–0.7)
EOSINOPHIL NFR BLD AUTO: 0 % — SIGNIFICANT CHANGE UP (ref 0–8)
GLUCOSE SERPL-MCNC: 159 MG/DL — HIGH (ref 70–99)
HCT VFR BLD CALC: 38.5 % — SIGNIFICANT CHANGE UP (ref 37–47)
HGB BLD-MCNC: 12.5 G/DL — SIGNIFICANT CHANGE UP (ref 12–16)
IMM GRANULOCYTES NFR BLD AUTO: 1 % — HIGH (ref 0.1–0.3)
INR BLD: 1.13 RATIO — SIGNIFICANT CHANGE UP (ref 0.65–1.3)
INR BLD: 1.15 RATIO — SIGNIFICANT CHANGE UP (ref 0.65–1.3)
LDH SERPL L TO P-CCNC: 309 — HIGH (ref 50–242)
LYMPHOCYTES # BLD AUTO: 0.63 K/UL — LOW (ref 1.2–3.4)
LYMPHOCYTES # BLD AUTO: 6.7 % — LOW (ref 20.5–51.1)
MAGNESIUM SERPL-MCNC: 2.2 MG/DL — SIGNIFICANT CHANGE UP (ref 1.8–2.4)
MCHC RBC-ENTMCNC: 28.2 PG — SIGNIFICANT CHANGE UP (ref 27–31)
MCHC RBC-ENTMCNC: 32.5 G/DL — SIGNIFICANT CHANGE UP (ref 32–37)
MCV RBC AUTO: 86.7 FL — SIGNIFICANT CHANGE UP (ref 81–99)
MONOCYTES # BLD AUTO: 0.54 K/UL — SIGNIFICANT CHANGE UP (ref 0.1–0.6)
MONOCYTES NFR BLD AUTO: 5.7 % — SIGNIFICANT CHANGE UP (ref 1.7–9.3)
NEUTROPHILS # BLD AUTO: 8.17 K/UL — HIGH (ref 1.4–6.5)
NEUTROPHILS NFR BLD AUTO: 86.5 % — HIGH (ref 42.2–75.2)
NRBC # BLD: 0 /100 WBCS — SIGNIFICANT CHANGE UP (ref 0–0)
PLATELET # BLD AUTO: 495 K/UL — HIGH (ref 130–400)
POTASSIUM SERPL-MCNC: 4.3 MMOL/L — SIGNIFICANT CHANGE UP (ref 3.5–5)
POTASSIUM SERPL-SCNC: 4.3 MMOL/L — SIGNIFICANT CHANGE UP (ref 3.5–5)
PROT SERPL-MCNC: 6.1 G/DL — SIGNIFICANT CHANGE UP (ref 6–8)
PROTHROM AB SERPL-ACNC: 13 SEC — HIGH (ref 9.95–12.87)
PROTHROM AB SERPL-ACNC: 13.2 SEC — HIGH (ref 9.95–12.87)
RBC # BLD: 4.44 M/UL — SIGNIFICANT CHANGE UP (ref 4.2–5.4)
RBC # FLD: 13.8 % — SIGNIFICANT CHANGE UP (ref 11.5–14.5)
SODIUM SERPL-SCNC: 139 MMOL/L — SIGNIFICANT CHANGE UP (ref 135–146)
WBC # BLD: 9.44 K/UL — SIGNIFICANT CHANGE UP (ref 4.8–10.8)
WBC # FLD AUTO: 9.44 K/UL — SIGNIFICANT CHANGE UP (ref 4.8–10.8)

## 2021-07-20 PROCEDURE — 99232 SBSQ HOSP IP/OBS MODERATE 35: CPT

## 2021-07-20 RX ORDER — DEXAMETHASONE 0.5 MG/5ML
1 ELIXIR ORAL
Qty: 7 | Refills: 0
Start: 2021-07-20 | End: 2021-07-26

## 2021-07-20 RX ORDER — PANTOPRAZOLE SODIUM 20 MG/1
1 TABLET, DELAYED RELEASE ORAL
Qty: 30 | Refills: 0
Start: 2021-07-20 | End: 2021-08-18

## 2021-07-20 RX ORDER — CHLORHEXIDINE GLUCONATE 213 G/1000ML
1 SOLUTION TOPICAL ONCE
Refills: 0 | Status: COMPLETED | OUTPATIENT
Start: 2021-07-20 | End: 2021-07-20

## 2021-07-20 RX ADMIN — CHLORHEXIDINE GLUCONATE 1 APPLICATION(S): 213 SOLUTION TOPICAL at 05:29

## 2021-07-20 RX ADMIN — Medication 650 MILLIGRAM(S): at 18:47

## 2021-07-20 RX ADMIN — PANTOPRAZOLE SODIUM 40 MILLIGRAM(S): 20 TABLET, DELAYED RELEASE ORAL at 05:30

## 2021-07-20 RX ADMIN — Medication 650 MILLIGRAM(S): at 12:54

## 2021-07-20 RX ADMIN — ENOXAPARIN SODIUM 40 MILLIGRAM(S): 100 INJECTION SUBCUTANEOUS at 17:01

## 2021-07-20 RX ADMIN — Medication 650 MILLIGRAM(S): at 02:00

## 2021-07-20 RX ADMIN — Medication 650 MILLIGRAM(S): at 01:30

## 2021-07-20 RX ADMIN — Medication 650 MILLIGRAM(S): at 13:30

## 2021-07-20 RX ADMIN — Medication 650 MILLIGRAM(S): at 19:17

## 2021-07-20 RX ADMIN — REMDESIVIR 500 MILLIGRAM(S): 5 INJECTION INTRAVENOUS at 16:17

## 2021-07-20 RX ADMIN — ENOXAPARIN SODIUM 40 MILLIGRAM(S): 100 INJECTION SUBCUTANEOUS at 05:30

## 2021-07-20 RX ADMIN — Medication 6 MILLIGRAM(S): at 05:29

## 2021-07-20 NOTE — DISCHARGE NOTE PROVIDER - CARE PROVIDER_API CALL
Moises Shanks (DO)  [facility]  [address], [address] [place] [address]  Phone: [phone]  Fax: [phone]  Follow Up Time: 1 week

## 2021-07-20 NOTE — PROGRESS NOTE ADULT - SUBJECTIVE AND OBJECTIVE BOX
CRISSY BARKLEY  30y, Female    All available historical data reviewed    OVERNIGHT EVENTS:  no fevers  feels well and has no complaints  94% NC 4 LIT    ROS:  General: Denies rigors, nightsweats  HEENT: Denies headache, rhinorrhea, sore throat, eye pain  CV: Denies CP, palpitations  PULM: Denies wheezing, hemoptysis  GI: Denies hematemesis, hematochezia, melena  : Denies discharge, hematuria  MSK: Denies arthralgias, myalgias  SKIN: Denies rash, lesions  NEURO: Denies paresthesias, weakness  PSYCH: Denies depression, anxiety    VITALS:  T(F): 96.9, Max: 96.9 (07-20-21 @ 05:00)  HR: 70  BP: 110/56  RR: 18Vital Signs Last 24 Hrs  T(C): 36.1 (20 Jul 2021 05:00), Max: 36.1 (20 Jul 2021 05:00)  T(F): 96.9 (20 Jul 2021 05:00), Max: 96.9 (20 Jul 2021 05:00)  HR: 70 (20 Jul 2021 05:00) (65 - 70)  BP: 110/56 (20 Jul 2021 05:00) (93/60 - 110/56)  BP(mean): --  RR: 18 (20 Jul 2021 05:00) (18 - 18)  SpO2: 94% (20 Jul 2021 08:06) (93% - 96%)    TESTS & MEASUREMENTS:                        12.5   9.44  )-----------( 495      ( 20 Jul 2021 04:30 )             38.5     07-20    139  |  102  |  14  ----------------------------<  159<H>  4.3   |  25  |  0.6<L>    Ca    8.6      20 Jul 2021 04:30  Mg     2.2     07-20    TPro  6.1  /  Alb  3.4<L>  /  TBili  0.3  /  DBili  x   /  AST  38  /  ALT  129<H>  /  AlkPhos  71  07-20    LIVER FUNCTIONS - ( 20 Jul 2021 04:30 )  Alb: 3.4 g/dL / Pro: 6.1 g/dL / ALK PHOS: 71 U/L / ALT: 129 U/L / AST: 38 U/L / GGT: x                   RADIOLOGY & ADDITIONAL TESTS:  Personal review of radiological diagnostics performed  Echo and EKG results noted when applicable.     MEDICATIONS:  acetaminophen   Tablet .. 650 milliGRAM(s) Oral every 6 hours PRN  dexAMETHasone  Injectable 6 milliGRAM(s) IV Push daily  enoxaparin Injectable 40 milliGRAM(s) SubCutaneous two times a day  pantoprazole    Tablet 40 milliGRAM(s) Oral before breakfast  remdesivir  IVPB 100 milliGRAM(s) IV Intermittent every 24 hours  remdesivir  IVPB   IV Intermittent       ANTIBIOTICS:  remdesivir  IVPB 100 milliGRAM(s) IV Intermittent every 24 hours  remdesivir  IVPB   IV Intermittent

## 2021-07-20 NOTE — DISCHARGE NOTE NURSING/CASE MANAGEMENT/SOCIAL WORK - PATIENT PORTAL LINK FT
You can access the FollowMyHealth Patient Portal offered by [facility] by registering at the following website: [email or web address]. By joining [facility]’s FollowMyHealth portal, you will also be able to view your health information using other applications (apps) compatible with our system.

## 2021-07-20 NOTE — PROGRESS NOTE ADULT - SUBJECTIVE AND OBJECTIVE BOX
CRISSY BARKLEY 30y Female  MRN#: [id number]   CODE STATUS: Full    Hospital Day: 3d    Pt is currently admitted with the primary diagnosis of Covid-19 Hypoxia.    SUBJECTIVE  Hospital Course    Overnight events: None reported. Patient feeling well, no new symptoms reported.    Subjective complaints: reports SOB while ambulating, cough, denies headache, fever, chills.    Present Today:   - Hansen:  No [ x ], Yes [   ] : Indication:     - Type of IV Access:       .. CVC/Piccline:  No [ x ], Yes [   ] : Indication:       .. Midline: No [ x ], Yes [   ] : Indication:                                             ----------------------------------------------------------  OBJECTIVE  PAST MEDICAL & SURGICAL HISTORY  HTN (hypertension)    No significant past surgical history                                              -----------------------------------------------------------  ALLERGIES:  No Known Allergies                                            ------------------------------------------------------------    HOME MEDICATIONS  Home Medications:                           MEDICATIONS:  STANDING MEDICATIONS  dexAMETHasone  Injectable 6 milliGRAM(s) IV Push daily  enoxaparin Injectable 40 milliGRAM(s) SubCutaneous two times a day  pantoprazole    Tablet 40 milliGRAM(s) Oral before breakfast  remdesivir  IVPB 100 milliGRAM(s) IV Intermittent every 24 hours  remdesivir  IVPB   IV Intermittent     PRN MEDICATIONS  acetaminophen   Tablet .. 650 milliGRAM(s) Oral every 6 hours PRN                                            ------------------------------------------------------------  VITAL SIGNS: Last 24 Hours  T(C): 36.1 (20 Jul 2021 05:00), Max: 36.1 (20 Jul 2021 05:00)  T(F): 96.9 (20 Jul 2021 05:00), Max: 96.9 (20 Jul 2021 05:00)  HR: 70 (20 Jul 2021 05:00) (65 - 70)  BP: 110/56 (20 Jul 2021 05:00) (93/60 - 110/56)  BP(mean): --  RR: 18 (20 Jul 2021 05:00) (18 - 18)  SpO2: 94% (20 Jul 2021 08:06) (93% - 96%)                                             --------------------------------------------------------------  LABS:                        12.5   9.44  )-----------( 495      ( 20 Jul 2021 04:30 )             38.5     07-19    x   |  x   |  x   ----------------------------<  x   x    |  x   |  0.5<L>      TPro  6.4  /  Alb  3.4<L>  /  TBili  0.3  /  DBili  <0.2  /  AST  82<H>  /  ALT  181<H>  /  AlkPhos  69  07-19    PT/INR - ( 20 Jul 2021 04:30 )   PT: 13.00 sec;   INR: 1.13 ratio         PTT - ( 20 Jul 2021 04:30 )  PTT:30.0 sec                                                          -------------------------------------------------------------  RADIOLOGY:                                            --------------------------------------------------------------    PHYSICAL EXAM:  General:   HEENT:  LUNGS:  HEART:  ABDOMEN:  EXT:  NEURO:  SKIN:                                           --------------------------------------------------------------    ASSESSMENT & PLAN    Past medical history and hospital course                                                                                                           ----------------------------------------------------  # DVT prophylaxis     # GI prophylaxis     # Diet     # Activity Score (AM-PAC)    # Code status     # Disposition                                                                              --------------------------------------------------------    # Handoff      CRISSY BARKLEY 30y Female  MRN#: [id number]   CODE STATUS: Full    Hospital Day: 3d    Pt is currently admitted with the primary diagnosis of Covid-19 Hypoxia.    SUBJECTIVE  Hospital Course    Overnight events: None reported. Patient feeling well, no new symptoms reported.    Subjective complaints: reports SOB while ambulating, cough, denies headache, fever, chills.    Present Today:   - Hansen:  No [ x ], Yes [   ] : Indication:     - Type of IV Access:       .. CVC/Piccline:  No [ x ], Yes [   ] : Indication:       .. Midline: No [ x ], Yes [   ] : Indication:                                             ----------------------------------------------------------  OBJECTIVE  PAST MEDICAL & SURGICAL HISTORY  HTN (hypertension)    No significant past surgical history                                              -----------------------------------------------------------  ALLERGIES:  No Known Allergies                                            ------------------------------------------------------------    HOME MEDICATIONS  Home Medications:                           MEDICATIONS:  STANDING MEDICATIONS  dexAMETHasone  Injectable 6 milliGRAM(s) IV Push daily  enoxaparin Injectable 40 milliGRAM(s) SubCutaneous two times a day  pantoprazole    Tablet 40 milliGRAM(s) Oral before breakfast  remdesivir  IVPB 100 milliGRAM(s) IV Intermittent every 24 hours  remdesivir  IVPB   IV Intermittent     PRN MEDICATIONS  acetaminophen   Tablet .. 650 milliGRAM(s) Oral every 6 hours PRN                                            ------------------------------------------------------------  VITAL SIGNS: Last 24 Hours  T(C): 36.1 (20 Jul 2021 05:00), Max: 36.1 (20 Jul 2021 05:00)  T(F): 96.9 (20 Jul 2021 05:00), Max: 96.9 (20 Jul 2021 05:00)  HR: 70 (20 Jul 2021 05:00) (65 - 70)  BP: 110/56 (20 Jul 2021 05:00) (93/60 - 110/56)  BP(mean): --  RR: 18 (20 Jul 2021 05:00) (18 - 18)  SpO2: 94% (20 Jul 2021 08:06) (93% - 96%)                                             --------------------------------------------------------------  LABS:                        12.5   9.44  )-----------( 495      ( 20 Jul 2021 04:30 )             38.5     07-19    x   |  x   |  x   ----------------------------<  x   x    |  x   |  0.5<L>      TPro  6.4  /  Alb  3.4<L>  /  TBili  0.3  /  DBili  <0.2  /  AST  82<H>  /  ALT  181<H>  /  AlkPhos  69  07-19    PT/INR - ( 20 Jul 2021 04:30 )   PT: 13.00 sec;   INR: 1.13 ratio         PTT - ( 20 Jul 2021 04:30 )  PTT:30.0 sec                                                          -------------------------------------------------------------  RADIOLOGY:                                            --------------------------------------------------------------    PHYSICAL EXAM:  General: resting comfortably, no acute distress  HEENT: EOMI, atraumatic  LUNGS: clear to auscultation bilaterally, normal respiratory rate  HEART: regular rate/rhythm, no murmurs/gallops  ABDOMEN: soft, nontender, normoactive bowel sounds  EXT: 5/5 in upper/lower extremities bilaterally  NEURO: aaox3, no focal deficits noted  SKIN: intact, no new lesions noted.                                           --------------------------------------------------------------    ASSESSMENT & PLAN    Past medical history and hospital course     #Acute hypoxemic respiratory failure likely secondary to COVID-19 PNA  - patient reports improvement in sx over weekend  - IV remdesivir 100mg qdaily for 4 doses, starting 7/19  - Dexamethasone IV push 6mg qdaily, 7d, starting 7/19  - Tylenol PRN  - remains on 4L O2 NC, satting 92-93  - patient ambulating without difficulty  - Ferritin 1241, , Ddimers 436 on 7/18  - repeat inflammatory markers and coag panel ordered today  - monitor hepatic, renal function, coag panel    #Active smoker  -  on cessation, f/u with PCP outpatient    # obesity; BMI >30  -  on weight loss, f/u with PCP outpatient                                                                              ----------------------------------------------------  # DVT prophylaxis: Lovenox BID (BMI >30)    # GI prophylaxis: Protonix    # Diet: DASH/TLC    # Activity Score: AAT    # Code status: Full    # Disposition: Remain on Floor                                                                           --------------------------------------------------------  # Handoff: Monitor sx, continue remdesivir/steroids, ID following, wean off O2 as tolerated, encourage ambulation as tolerated. CRISSY BARKLEY 30y Female  MRN#: [id number]   CODE STATUS: Full    Hospital Day: 3d    Pt is currently admitted with the primary diagnosis of Covid-19 Hypoxia.    SUBJECTIVE  Hospital Course    Overnight events: None reported. Patient feeling well, no new symptoms reported.    Subjective complaints: reports SOB while ambulating, cough, denies headache, fever, chills.    Present Today:   - Hansen:  No [ x ], Yes [   ] : Indication:     - Type of IV Access:       .. CVC/Piccline:  No [ x ], Yes [   ] : Indication:       .. Midline: No [ x ], Yes [   ] : Indication:                                             ----------------------------------------------------------  OBJECTIVE  PAST MEDICAL & SURGICAL HISTORY  HTN (hypertension)    No significant past surgical history                                              -----------------------------------------------------------  ALLERGIES:  No Known Allergies                                            ------------------------------------------------------------    HOME MEDICATIONS  Home Medications:                           MEDICATIONS:  STANDING MEDICATIONS  dexAMETHasone  Injectable 6 milliGRAM(s) IV Push daily  enoxaparin Injectable 40 milliGRAM(s) SubCutaneous two times a day  pantoprazole    Tablet 40 milliGRAM(s) Oral before breakfast  remdesivir  IVPB 100 milliGRAM(s) IV Intermittent every 24 hours  remdesivir  IVPB   IV Intermittent     PRN MEDICATIONS  acetaminophen   Tablet .. 650 milliGRAM(s) Oral every 6 hours PRN                                            ------------------------------------------------------------  VITAL SIGNS: Last 24 Hours  T(C): 36.1 (20 Jul 2021 05:00), Max: 36.1 (20 Jul 2021 05:00)  T(F): 96.9 (20 Jul 2021 05:00), Max: 96.9 (20 Jul 2021 05:00)  HR: 70 (20 Jul 2021 05:00) (65 - 70)  BP: 110/56 (20 Jul 2021 05:00) (93/60 - 110/56)  BP(mean): --  RR: 18 (20 Jul 2021 05:00) (18 - 18)  SpO2: 94% (20 Jul 2021 08:06) (93% - 96%)                                             --------------------------------------------------------------  LABS:                        12.5   9.44  )-----------( 495      ( 20 Jul 2021 04:30 )             38.5     07-19    x   |  x   |  x   ----------------------------<  x   x    |  x   |  0.5<L>      TPro  6.4  /  Alb  3.4<L>  /  TBili  0.3  /  DBili  <0.2  /  AST  82<H>  /  ALT  181<H>  /  AlkPhos  69  07-19    PT/INR - ( 20 Jul 2021 04:30 )   PT: 13.00 sec;   INR: 1.13 ratio         PTT - ( 20 Jul 2021 04:30 )  PTT:30.0 sec                                                          -------------------------------------------------------------  RADIOLOGY:                                            --------------------------------------------------------------    PHYSICAL EXAM:  General: resting comfortably, no acute distress  HEENT: EOMI, atraumatic  LUNGS: clear to auscultation bilaterally, normal respiratory rate  HEART: regular rate/rhythm, no murmurs/gallops  ABDOMEN: soft, nontender, normoactive bowel sounds  EXT: 5/5 in upper/lower extremities bilaterally  NEURO: aaox3, no focal deficits noted  SKIN: intact, no new lesions noted.                                           --------------------------------------------------------------    ASSESSMENT & PLAN    Past medical history and hospital course     #Acute hypoxemic respiratory failure likely secondary to COVID-19 PNA  - patient reports improvement in sx over weekend  - ID consulted and following  - IV remdesivir 200mg qdaily single dose day 1 on 7/19 loading dose  - IV remdesivir 100mg qdaily for 4 doses, starting 7/20, days 2-5 maintenance doses  - Dexamethasone IV push 6mg qdaily, total 10d, starting 7/19  - Tylenol PRN  - remains on 4L O2 NC, satting 92-93  - patient ambulating without difficulty  - Ferritin 1241, , Ddimers 436 on 7/18  - repeat inflammatory markers and coag panel ordered today  - monitor hepatic, renal function, coag panel    #Active smoker  -  on cessation, f/u with PCP outpatient    # obesity; BMI >30  -  on weight loss, f/u with PCP outpatient                                                                              ----------------------------------------------------  # DVT prophylaxis: Lovenox BID (BMI >30)    # GI prophylaxis: Protonix    # Diet: DASH/TLC    # Activity Score: AAT    # Code status: Full    # Disposition: Remain on Floor, possible d/c tomorrow                                                                           --------------------------------------------------------  # Handoff: Monitor sx, continue remdesivir/steroids, ID following, wean off O2 as tolerated, encourage ambulation as tolerated.

## 2021-07-20 NOTE — PROGRESS NOTE ADULT - ASSESSMENT
29 YO F with PMH of HTN, COVID-19 +ve (07/13/2021) who presents to the hospital with a c/c of SOB for the past x 6 days.    #Acute hypoxemic respiratory failure likely secondary to COVID-19 PNA  #Sepsis present on admission due to covid pneumonia  patient needing supplemental oxygen  Ferritin 1241    Ddimers 436  started on remdesivir 200 mg IV once and then 100 mg IV daily  continue dexa 6 mg IV daily  monitor hepatic, renal function, coag panel    #Active smoker    # obesity; BMI >30    #Misc  - DVT Prophylaxis: Lovenox BID ; BMI >30  - GI Prophylaxis: Pantoprazole   - Activity: AAT  - Code Status: Full Code    
29 YO F with PMH of HTN, COVID-19 +ve (07/13/2021) who presents to the hospital with a c/c of SOB for the past x 6 days. She also reports associated fevers. Denies any runny nose, sore throat, rashes, CP, palpitations, LE swelling, N/V/D, ABD pain, and dysuria, sick contacts, recent travel. She did not get the COVID-19 Vaccine.    IMPRESSION;  COVID 19 with severe illness. SpO2 < 94% on RA and need for supplemental O2.  Pt is in the early viral replicative phase based on the timeline/onset of symptoms.  Inflammatory markers are elevated and suggestive of a cytokine response/cytokine storm. Markers are downtrending  procalcitonin 0.20> 0.11  , not suggestive of a bacterial PNA.  Ferritin 1241>1158  >160  Ddimers 436>177    RECOMMENDATIONS;  No role for plasma /Toci  Target SpO2 92 % to 96 %  Day 1 : Single  mg IV loading dose  Day 2-5 : single  mg IV once daily maintenance dose  Daily CBC,CMP.  Dexamethasone 6 mg iv q24h for 10 days.  Monitor for side effects: hyperglycemia, neurological ( agitation/confusion), adrenal suppression, bacterial and fungal infections  Could finish course of steroids with po prednisone 40 mg q24 for a total of 10 days  Further management can be performed as an outpt by contacting [facility] services   recall prn please   Anticoagulation as per team.

## 2021-07-20 NOTE — CHART NOTE - NSCHARTNOTEFT_GEN_A_CORE
Despite treatment with antibiotics and steroids, patient remains hypoxic.   O2 on RA resting = 88%, on ambulation = 83%, resting on 3L NC = 95%, O2 on ambulation on 3L NC = 90%  Patient will need home O2 at 3 LPM NC continuously due to a chronic diagnosis of COPD. Patient has been treated for an acute exacerbation of COPD and acute condition has been resolved and patient is in a chronic stable state, but patient requires home O2 for a safe discharge. Patient was tested in a chronic, stable state. Patient is aware that she is going on home oxygen. Despite treatment with antibiotics and steroids, patient remains hypoxic.   O2 on room air resting = 88%, O2 on room air with ambulation = 83%, O2 while resting on 3L nasal cannula = 95%, O2 during ambulation on 3L nasal cannula = 90%.  Patient will need home O2 at 3LPM NC continuously due to a chronic diagnosis of COPD. Patient has been treated for an acute exacerbation of COPD and acute condition has been resolved and patient is in a chronic stable state, but patient requires home O2 for a safe discharge. Patient was tested in a chronic, stable state. Patient is aware that she is going on home oxygen.

## 2021-07-20 NOTE — DISCHARGE NOTE PROVIDER - NSDCMRMEDTOKEN_GEN_ALL_CORE_FT
dexamethasone 6 mg oral tablet: 1 tab(s) orally once a day   pantoprazole 40 mg oral delayed release tablet: 1 tab(s) orally once a day (before a meal)

## 2021-07-20 NOTE — PROGRESS NOTE ADULT - ATTENDING COMMENTS
patient feeling better. No wrosening of symptoms; but still on 4 L oxygen    O/e  BS  heard b/l; no wheeze or crepts    A&P    #Acute hypoxemic respiratory failure likely secondary to COVID-19 PNA  continue oxygen supplementation  discussed with ID physician- recommended to continue steroids    Arranging home care with [facility]  continue dexamethasone 6 mg 7 more days to finish 10 day course  Gi px- with protonix  Will plan on conditional discharge pending arrangements to home.

## 2021-07-20 NOTE — DISCHARGE NOTE PROVIDER - NSDCCPCAREPLAN_GEN_ALL_CORE_FT
PRINCIPAL DISCHARGE DIAGNOSIS  Diagnosis: COVID-19  Assessment and Plan of Treatment: You were diagnosed with Covid-19. You required oxygen support during your hospital stay. You were given medications to support your body during the healing process. Please continue with your steroid medications as prescribed.      SECONDARY DISCHARGE DIAGNOSES  Diagnosis: Hypoxia  Assessment and Plan of Treatment: Your oxygen levels were low without supplementation. Please use your home oxygen as instructed after leaving the hospital.

## 2021-07-20 NOTE — DISCHARGE NOTE PROVIDER - HOSPITAL COURSE
Patient is a 30 year old female with PMH of HTN, COVID-19 +ve (07/13/2021) who presented to the hospital with a c/c of SOB for the prior 6 days. She also reported associated fevers. Denies any runny nose, sore throat, rashes, CP, palpitations, LE swelling, N/V/D, ABD pain, and dysuria, sick contacts, recent travel. She did not get the COVID-19 Vaccine.  In the ED, Chest X-ray with b/l airspace opacities. Hypoxic to 90% on RA, placed on NC. COVID-19 PCR positive.    Patient's symptoms improved since admission. Given loading dose of 200mg Remdesivir 7/18, 100mg maintenance dose 7/19 and 7/20. 6mg IV push dexamethasone started 7/18. Has occasional SOB during ambulation and mild cough, but afebrile and denies chills, headaches, abdominal pain, N/V/D. Inflammatory markers trended down. Duplex U/S was negative. Stable for discharge, will be leaving with PO prednisone and home O2 and ok from infectious disease perspective to d/c Remdesivir. Patient is a 30 year old female with PMH of HTN, COVID-19 +ve (07/13/2021) who presented to the hospital with a c/c of SOB for the prior 6 days. She also reported associated fevers. Denies any runny nose, sore throat, rashes, CP, palpitations, LE swelling, N/V/D, ABD pain, and dysuria, sick contacts, recent travel. She did not get the COVID-19 Vaccine.  In the ED, Chest X-ray with b/l airspace opacities. Hypoxic to 90% on RA, placed on NC. COVID-19 PCR positive.    Patient's symptoms improved since admission. Given loading dose of 200mg Remdesivir 7/18, 100mg maintenance dose 7/19 and 7/20. 6mg IV push dexamethasone started 7/18. Has occasional SOB during ambulation and mild cough, but afebrile and denies chills, headaches, abdominal pain, N/V/D. Inflammatory markers trended down. Duplex U/S was negative. Stable for discharge, will be leaving with PO decadron for addition 7 days (10 days total) and home O2 and ok from infectious disease perspective to d/c Remdesivir. Patient is a 30 year old female with PMH of HTN, COVID-19 +ve (07/13/2021) who presented to the hospital with a c/c of SOB for the prior 6 days. She also reported associated fevers. Denies any runny nose, sore throat, rashes, CP, palpitations, LE swelling, N/V/D, ABD pain, and dysuria, sick contacts, recent travel. She did not get the COVID-19 Vaccine.  In the ED, Chest X-ray with b/l airspace opacities. Hypoxic to 90% on RA, placed on NC. COVID-19 PCR positive.    Patient's symptoms improved since admission. Given loading dose of 200mg Remdesivir 7/18, 100mg maintenance dose 7/19 and 7/20. 6mg IV push dexamethasone started 7/18. Has occasional SOB during ambulation and mild cough, but afebrile and denies chills, headaches, abdominal pain, N/V/D. Inflammatory markers trended down. Duplex U/S was negative. Stable for discharge, will be leaving with PO decadron for addition 7 days (10 days total) and home O2 and ok from infectious disease perspective to d/c Remdesivir.     Patient is a 30 year old female with PMH of HTN, COVID-19 +ve (07/13/2021) who presented to the hospital with a c/c of SOB for the prior 6 days. She also reported associated fevers. Denies any runny nose, sore throat, rashes, CP, palpitations, LE swelling, N/V/D, ABD pain, and dysuria, sick contacts, recent travel. She did not get the COVID-19 Vaccine.  In the ED, Chest X-ray with b/l airspace opacities. Hypoxic to 90% on RA, placed on NC. COVID-19 PCR positive.    Patient's symptoms improved since admission. Given loading dose of 200mg Remdesivir 7/18, 100mg maintenance dose 7/19 and 7/20. 6mg IV push dexamethasone started 7/18. Has occasional SOB during ambulation and mild cough, but afebrile and denies chills, headaches, abdominal pain, N/V/D. Inflammatory markers trended down. Duplex U/S was negative. Stable for discharge, will be leaving with PO decadron for addition 7 days (10 days total) and home O2 and ok from infectious disease perspective to d/c Remdesivir.      Attending addendum: Patient seen and examined. Patient is stable for discharge. Med rec reviewed.

## 2021-07-21 VITALS
HEART RATE: 83 BPM | DIASTOLIC BLOOD PRESSURE: 77 MMHG | OXYGEN SATURATION: 97 % | TEMPERATURE: 97 F | SYSTOLIC BLOOD PRESSURE: 103 MMHG | RESPIRATION RATE: 18 BRPM

## 2021-07-21 LAB
ANION GAP SERPL CALC-SCNC: 8 MMOL/L — SIGNIFICANT CHANGE UP (ref 7–14)
BASOPHILS # BLD AUTO: 0.02 K/UL — SIGNIFICANT CHANGE UP (ref 0–0.2)
BASOPHILS NFR BLD AUTO: 0.2 % — SIGNIFICANT CHANGE UP (ref 0–1)
BUN SERPL-MCNC: 11 MG/DL — SIGNIFICANT CHANGE UP (ref 10–20)
CALCIUM SERPL-MCNC: 9 MG/DL — SIGNIFICANT CHANGE UP (ref 8.5–10.1)
CHLORIDE SERPL-SCNC: 101 MMOL/L — SIGNIFICANT CHANGE UP (ref 98–110)
CO2 SERPL-SCNC: 29 MMOL/L — SIGNIFICANT CHANGE UP (ref 17–32)
CREAT SERPL-MCNC: 0.6 MG/DL — LOW (ref 0.7–1.5)
CRP SERPL-MCNC: 48 MG/L — HIGH
EOSINOPHIL # BLD AUTO: 0.01 K/UL — SIGNIFICANT CHANGE UP (ref 0–0.7)
EOSINOPHIL NFR BLD AUTO: 0.1 % — SIGNIFICANT CHANGE UP (ref 0–8)
FERRITIN SERPL-MCNC: 482 NG/ML — HIGH (ref 15–150)
FERRITIN SERPL-MCNC: 524 NG/ML — HIGH (ref 15–150)
GLUCOSE SERPL-MCNC: 137 MG/DL — HIGH (ref 70–99)
HCT VFR BLD CALC: 40.6 % — SIGNIFICANT CHANGE UP (ref 37–47)
HGB BLD-MCNC: 13.2 G/DL — SIGNIFICANT CHANGE UP (ref 12–16)
IMM GRANULOCYTES NFR BLD AUTO: 1.2 % — HIGH (ref 0.1–0.3)
INR BLD: 1.12 RATIO — SIGNIFICANT CHANGE UP (ref 0.65–1.3)
LYMPHOCYTES # BLD AUTO: 0.68 K/UL — LOW (ref 1.2–3.4)
LYMPHOCYTES # BLD AUTO: 7.5 % — LOW (ref 20.5–51.1)
MAGNESIUM SERPL-MCNC: 2.3 MG/DL — SIGNIFICANT CHANGE UP (ref 1.8–2.4)
MCHC RBC-ENTMCNC: 28.3 PG — SIGNIFICANT CHANGE UP (ref 27–31)
MCHC RBC-ENTMCNC: 32.5 G/DL — SIGNIFICANT CHANGE UP (ref 32–37)
MCV RBC AUTO: 86.9 FL — SIGNIFICANT CHANGE UP (ref 81–99)
MONOCYTES # BLD AUTO: 0.58 K/UL — SIGNIFICANT CHANGE UP (ref 0.1–0.6)
MONOCYTES NFR BLD AUTO: 6.4 % — SIGNIFICANT CHANGE UP (ref 1.7–9.3)
NEUTROPHILS # BLD AUTO: 7.67 K/UL — HIGH (ref 1.4–6.5)
NEUTROPHILS NFR BLD AUTO: 84.6 % — HIGH (ref 42.2–75.2)
NRBC # BLD: 0 /100 WBCS — SIGNIFICANT CHANGE UP (ref 0–0)
PLATELET # BLD AUTO: 586 K/UL — HIGH (ref 130–400)
POTASSIUM SERPL-MCNC: 4.7 MMOL/L — SIGNIFICANT CHANGE UP (ref 3.5–5)
POTASSIUM SERPL-SCNC: 4.7 MMOL/L — SIGNIFICANT CHANGE UP (ref 3.5–5)
PROTHROM AB SERPL-ACNC: 12.9 SEC — HIGH (ref 9.95–12.87)
RBC # BLD: 4.67 M/UL — SIGNIFICANT CHANGE UP (ref 4.2–5.4)
RBC # FLD: 13.7 % — SIGNIFICANT CHANGE UP (ref 11.5–14.5)
SODIUM SERPL-SCNC: 138 MMOL/L — SIGNIFICANT CHANGE UP (ref 135–146)
WBC # BLD: 9.07 K/UL — SIGNIFICANT CHANGE UP (ref 4.8–10.8)
WBC # FLD AUTO: 9.07 K/UL — SIGNIFICANT CHANGE UP (ref 4.8–10.8)

## 2021-07-21 PROCEDURE — 99238 HOSP IP/OBS DSCHRG MGMT 30/<: CPT

## 2021-07-21 RX ADMIN — ENOXAPARIN SODIUM 40 MILLIGRAM(S): 100 INJECTION SUBCUTANEOUS at 06:29

## 2021-07-21 RX ADMIN — Medication 6 MILLIGRAM(S): at 06:29

## 2021-07-21 RX ADMIN — PANTOPRAZOLE SODIUM 40 MILLIGRAM(S): 20 TABLET, DELAYED RELEASE ORAL at 06:29

## 2021-07-21 NOTE — PROGRESS NOTE ADULT - SUBJECTIVE AND OBJECTIVE BOX
CRISSY BARKLEY 30y Female  MRN#: [id number]   CODE STATUS: Full    Hospital Day: 4d    Pt is currently admitted with the primary diagnosis of Covid Hypoxia.    SUBJECTIVE  Hospital Course    Overnight events: none reported. patient reports feeling well, ambulating without issue.    Subjective complaints: endorses mild SOB, cough, denies headache, fever, N/V/D, abdominal pain.    Present Today:   - Hansen:  No [ x ], Yes [   ] : Indication:     - Type of IV Access:       .. CVC/Piccline:  No [ x ], Yes [   ] : Indication:       .. Midline: No [ x ], Yes [   ] : Indication:                                             ----------------------------------------------------------  OBJECTIVE  PAST MEDICAL & SURGICAL HISTORY  HTN (hypertension)    No significant past surgical history                                              -----------------------------------------------------------  ALLERGIES:  No Known Allergies                                            ------------------------------------------------------------    HOME MEDICATIONS  Home Medications:                           MEDICATIONS:  STANDING MEDICATIONS  dexAMETHasone  Injectable 6 milliGRAM(s) IV Push daily  enoxaparin Injectable 40 milliGRAM(s) SubCutaneous two times a day  pantoprazole    Tablet 40 milliGRAM(s) Oral before breakfast  remdesivir  IVPB 100 milliGRAM(s) IV Intermittent every 24 hours  remdesivir  IVPB   IV Intermittent     PRN MEDICATIONS  acetaminophen   Tablet .. 650 milliGRAM(s) Oral every 6 hours PRN                                            ------------------------------------------------------------  VITAL SIGNS: Last 24 Hours  T(C): 35.9 (21 Jul 2021 04:35), Max: 36.4 (20 Jul 2021 13:02)  T(F): 96.7 (21 Jul 2021 04:35), Max: 97.5 (20 Jul 2021 13:02)  HR: 80 (21 Jul 2021 04:35) (71 - 84)  BP: 112/79 (21 Jul 2021 04:35) (102/59 - 112/79)  BP(mean): --  RR: 18 (21 Jul 2021 04:35) (18 - 18)  SpO2: 95% (21 Jul 2021 04:35) (94% - 96%)                                             --------------------------------------------------------------  LABS:                        12.5   9.44  )-----------( 495      ( 20 Jul 2021 04:30 )             38.5     07-20    139  |  102  |  14  ----------------------------<  159<H>  4.3   |  25  |  0.6<L>    Ca    8.6      20 Jul 2021 04:30  Mg     2.2     07-20    TPro  6.1  /  Alb  3.4<L>  /  TBili  0.3  /  DBili  x   /  AST  38  /  ALT  129<H>  /  AlkPhos  71  07-20    PT/INR - ( 20 Jul 2021 18:32 )   PT: 13.20 sec;   INR: 1.15 ratio         PTT - ( 20 Jul 2021 04:30 )  PTT:30.0 sec                                                          -------------------------------------------------------------  RADIOLOGY:                                            --------------------------------------------------------------    PHYSICAL EXAM:  General: alert, awake, NAD  HEENT: EOMI, atraumatic  LUNGS: clear to auscultation bilaterally  HEART: regular rate/rhythm, no murmurs/gallops  ABDOMEN: soft, nontender, normoactive bowel sounds  EXT: 5/5 strength upper/lower bilaterally  NEURO: aaox3, no focal deficits  SKIN: no new lesions noted                                           --------------------------------------------------------------    ASSESSMENT & PLAN    Past medical history and hospital course     #Acute hypoxemic respiratory failure likely secondary to COVID-19 PNA  - patient reports improvement in sx over weekend  - ID consulted and following  - IV remdesivir 200mg qdaily single dose day 1 on 7/19 loading dose  - IV remdesivir 100mg qdaily for 4 doses, starting 7/20, days 2-5 maintenance doses  - Dexamethasone IV push 6mg qdaily, total 10d, starting 7/19  - Tylenol PRN  - remains on 4L O2 NC, satting 92-93  - patient ambulating without difficulty  - Ferritin 1241, , Ddimers 436 on 7/18  - repeat inflammatory markers and coag panel ordered today  - monitor hepatic, renal function, coag panel    #Active smoker  -  on cessation, f/u with PCP outpatient    # obesity; BMI >30  -  on weight loss, f/u with PCP outpatient                                                                              ----------------------------------------------------  # DVT prophylaxis: Lovenox BID (BMI >30)    # GI prophylaxis: Protonix    # Diet: DASH/TLC    # Activity Score: AAT    # Code status: Full    # Disposition: d/c today                                                                           --------------------------------------------------------  # Handoff: Monitor sx, wean off O2 as tolerated, PO steroids for additional 7d, encourage ambulation, d/c today                                                                                                        ----------------------------------------------------  # DVT prophylaxis     # GI prophylaxis     # Diet     # Activity Score (AM-PAC)    # Code status     # Disposition                                                                              --------------------------------------------------------    # Handoff      CRISSY BARKLEY 30y Female  MRN#: [id number]   CODE STATUS: Full    Hospital Day: 4d    Pt is currently admitted with the primary diagnosis of Covid Hypoxia.    SUBJECTIVE  Hospital Course    Overnight events: none reported. patient reports feeling well, ambulating without issue.    Subjective complaints: endorses mild SOB, cough, denies headache, fever, N/V/D, abdominal pain.    Present Today:   - Hansen:  No [ x ], Yes [   ] : Indication:     - Type of IV Access:       .. CVC/Piccline:  No [ x ], Yes [   ] : Indication:       .. Midline: No [ x ], Yes [   ] : Indication:                                             ----------------------------------------------------------  OBJECTIVE  PAST MEDICAL & SURGICAL HISTORY  HTN (hypertension)    No significant past surgical history                                              -----------------------------------------------------------  ALLERGIES:  No Known Allergies                                            ------------------------------------------------------------    HOME MEDICATIONS  Home Medications:                           MEDICATIONS:  STANDING MEDICATIONS  dexAMETHasone  Injectable 6 milliGRAM(s) IV Push daily  enoxaparin Injectable 40 milliGRAM(s) SubCutaneous two times a day  pantoprazole    Tablet 40 milliGRAM(s) Oral before breakfast  remdesivir  IVPB 100 milliGRAM(s) IV Intermittent every 24 hours  remdesivir  IVPB   IV Intermittent     PRN MEDICATIONS  acetaminophen   Tablet .. 650 milliGRAM(s) Oral every 6 hours PRN                                            ------------------------------------------------------------  VITAL SIGNS: Last 24 Hours  T(C): 35.9 (21 Jul 2021 04:35), Max: 36.4 (20 Jul 2021 13:02)  T(F): 96.7 (21 Jul 2021 04:35), Max: 97.5 (20 Jul 2021 13:02)  HR: 80 (21 Jul 2021 04:35) (71 - 84)  BP: 112/79 (21 Jul 2021 04:35) (102/59 - 112/79)  BP(mean): --  RR: 18 (21 Jul 2021 04:35) (18 - 18)  SpO2: 95% (21 Jul 2021 04:35) (94% - 96%)                                             --------------------------------------------------------------  LABS:                        12.5   9.44  )-----------( 495      ( 20 Jul 2021 04:30 )             38.5     07-20    139  |  102  |  14  ----------------------------<  159<H>  4.3   |  25  |  0.6<L>    Ca    8.6      20 Jul 2021 04:30  Mg     2.2     07-20    TPro  6.1  /  Alb  3.4<L>  /  TBili  0.3  /  DBili  x   /  AST  38  /  ALT  129<H>  /  AlkPhos  71  07-20    PT/INR - ( 20 Jul 2021 18:32 )   PT: 13.20 sec;   INR: 1.15 ratio         PTT - ( 20 Jul 2021 04:30 )  PTT:30.0 sec                                                          -------------------------------------------------------------  RADIOLOGY:                                            --------------------------------------------------------------    PHYSICAL EXAM:  General: alert, awake, NAD  HEENT: EOMI, atraumatic  LUNGS: clear to auscultation bilaterally  HEART: regular rate/rhythm, no murmurs/gallops  ABDOMEN: soft, nontender, normoactive bowel sounds  EXT: 5/5 strength upper/lower bilaterally  NEURO: aaox3, no focal deficits  SKIN: no new lesions noted                                         --------------------------------------------------------------    ASSESSMENT & PLAN    Past medical history and hospital course:   30 year old female with PMHx of HTN who presents to the hospital with a c/c of SOB for the past x 6 days. She also reports associated fevers. Covid positive.    #Acute hypoxemic respiratory failure likely secondary to COVID-19 PNA  - patient reports improvement in sx over weekend  - ID consulted and following  - IV remdesivir 200mg qdaily single dose day 1 on 7/19 loading dose  - IV remdesivir 100mg qdaily for 4 doses, starting 7/20, days 2-5 maintenance doses  - Dexamethasone IV push 6mg qdaily, total 10d, starting 7/19  - Tylenol PRN  - remains on 4L O2 NC, satting 92-93  - patient ambulating without difficulty  - Ferritin 1241, , D-dimer 436 on 7/18  - repeat inflammatory markers and coag panel ordered 7/20  - Ferritin 482, , D-dimer 216, all down trending  - monitor hepatic, renal function, coag panel    #Active smoker  -  on cessation, f/u with PCP outpatient    # obesity; BMI >30  -  on weight loss, f/u with PCP outpatient                                                                              ----------------------------------------------------  # DVT prophylaxis: Lovenox BID (BMI >30)    # GI prophylaxis: Protonix    # Diet: DASH/TLC    # Activity Score: AAT    # Code status: Full    # Disposition: d/c today                                                                           --------------------------------------------------------  # Handoff: Monitor sx, wean off O2 as tolerated, PO steroids for additional 6d, encourage ambulation, d/c today

## 2021-07-28 DIAGNOSIS — J44.1 CHRONIC OBSTRUCTIVE PULMONARY DISEASE WITH (ACUTE) EXACERBATION: ICD-10-CM

## 2021-07-28 DIAGNOSIS — D72.810 LYMPHOCYTOPENIA: ICD-10-CM

## 2021-07-28 DIAGNOSIS — U07.1 COVID-19: ICD-10-CM

## 2021-07-28 DIAGNOSIS — F17.210 NICOTINE DEPENDENCE, CIGARETTES, UNCOMPLICATED: ICD-10-CM

## 2021-07-28 DIAGNOSIS — R74.01 ELEVATION OF LEVELS OF LIVER TRANSAMINASE LEVELS: ICD-10-CM

## 2021-07-28 DIAGNOSIS — R06.02 SHORTNESS OF BREATH: ICD-10-CM

## 2021-07-28 DIAGNOSIS — J96.01 ACUTE RESPIRATORY FAILURE WITH HYPOXIA: ICD-10-CM

## 2021-07-28 DIAGNOSIS — J44.0 CHRONIC OBSTRUCTIVE PULMONARY DISEASE WITH (ACUTE) LOWER RESPIRATORY INFECTION: ICD-10-CM

## 2021-07-28 DIAGNOSIS — E66.9 OBESITY, UNSPECIFIED: ICD-10-CM

## 2021-07-28 DIAGNOSIS — A41.89 OTHER SPECIFIED SEPSIS: ICD-10-CM

## 2021-07-28 DIAGNOSIS — J12.82 PNEUMONIA DUE TO CORONAVIRUS DISEASE 2019: ICD-10-CM

## 2021-07-28 DIAGNOSIS — I10 ESSENTIAL (PRIMARY) HYPERTENSION: ICD-10-CM

## 2023-05-20 ENCOUNTER — EMERGENCY (EMERGENCY)
Facility: HOSPITAL | Age: 33
LOS: 0 days | Discharge: HOME | End: 2023-05-20
Attending: EMERGENCY MEDICINE
Payer: SELF-PAY

## 2023-05-20 ENCOUNTER — TRANSCRIPTION ENCOUNTER (OUTPATIENT)
Age: 33
End: 2023-05-20

## 2023-05-20 VITALS
TEMPERATURE: 99 F | OXYGEN SATURATION: 99 % | HEART RATE: 76 BPM | DIASTOLIC BLOOD PRESSURE: 84 MMHG | RESPIRATION RATE: 18 BRPM | HEIGHT: 63 IN | SYSTOLIC BLOOD PRESSURE: 129 MMHG | WEIGHT: 195.11 LBS

## 2023-05-20 DIAGNOSIS — I10 ESSENTIAL (PRIMARY) HYPERTENSION: ICD-10-CM

## 2023-05-20 DIAGNOSIS — R22.0 LOCALIZED SWELLING, MASS AND LUMP, HEAD: ICD-10-CM

## 2023-05-20 LAB
ALBUMIN SERPL ELPH-MCNC: 4.1 G/DL — SIGNIFICANT CHANGE UP (ref 3.5–5.2)
ALP SERPL-CCNC: 87 U/L — SIGNIFICANT CHANGE UP (ref 30–115)
ALT FLD-CCNC: 18 U/L — SIGNIFICANT CHANGE UP (ref 0–41)
ANION GAP SERPL CALC-SCNC: 11 MMOL/L — SIGNIFICANT CHANGE UP (ref 7–14)
AST SERPL-CCNC: 26 U/L — SIGNIFICANT CHANGE UP (ref 0–41)
BASOPHILS # BLD AUTO: 0.04 K/UL — SIGNIFICANT CHANGE UP (ref 0–0.2)
BASOPHILS NFR BLD AUTO: 0.5 % — SIGNIFICANT CHANGE UP (ref 0–1)
BILIRUB SERPL-MCNC: 0.3 MG/DL — SIGNIFICANT CHANGE UP (ref 0.2–1.2)
BUN SERPL-MCNC: 10 MG/DL — SIGNIFICANT CHANGE UP (ref 10–20)
CALCIUM SERPL-MCNC: 8.8 MG/DL — SIGNIFICANT CHANGE UP (ref 8.4–10.5)
CHLORIDE SERPL-SCNC: 102 MMOL/L — SIGNIFICANT CHANGE UP (ref 98–110)
CO2 SERPL-SCNC: 25 MMOL/L — SIGNIFICANT CHANGE UP (ref 17–32)
CREAT SERPL-MCNC: 0.6 MG/DL — LOW (ref 0.7–1.5)
EGFR: 124 ML/MIN/1.73M2 — SIGNIFICANT CHANGE UP
EOSINOPHIL # BLD AUTO: 0.43 K/UL — SIGNIFICANT CHANGE UP (ref 0–0.7)
EOSINOPHIL NFR BLD AUTO: 5.1 % — SIGNIFICANT CHANGE UP (ref 0–8)
GLUCOSE SERPL-MCNC: 96 MG/DL — SIGNIFICANT CHANGE UP (ref 70–99)
HCG SERPL QL: NEGATIVE — SIGNIFICANT CHANGE UP
HCT VFR BLD CALC: 40.2 % — SIGNIFICANT CHANGE UP (ref 37–47)
HGB BLD-MCNC: 13 G/DL — SIGNIFICANT CHANGE UP (ref 12–16)
IMM GRANULOCYTES NFR BLD AUTO: 0.4 % — HIGH (ref 0.1–0.3)
LYMPHOCYTES # BLD AUTO: 2.16 K/UL — SIGNIFICANT CHANGE UP (ref 1.2–3.4)
LYMPHOCYTES # BLD AUTO: 25.7 % — SIGNIFICANT CHANGE UP (ref 20.5–51.1)
MCHC RBC-ENTMCNC: 27.8 PG — SIGNIFICANT CHANGE UP (ref 27–31)
MCHC RBC-ENTMCNC: 32.3 G/DL — SIGNIFICANT CHANGE UP (ref 32–37)
MCV RBC AUTO: 85.9 FL — SIGNIFICANT CHANGE UP (ref 81–99)
MONOCYTES # BLD AUTO: 0.63 K/UL — HIGH (ref 0.1–0.6)
MONOCYTES NFR BLD AUTO: 7.5 % — SIGNIFICANT CHANGE UP (ref 1.7–9.3)
NEUTROPHILS # BLD AUTO: 5.11 K/UL — SIGNIFICANT CHANGE UP (ref 1.4–6.5)
NEUTROPHILS NFR BLD AUTO: 60.8 % — SIGNIFICANT CHANGE UP (ref 42.2–75.2)
NRBC # BLD: 0 /100 WBCS — SIGNIFICANT CHANGE UP (ref 0–0)
PLATELET # BLD AUTO: 281 K/UL — SIGNIFICANT CHANGE UP (ref 130–400)
PMV BLD: 9.9 FL — SIGNIFICANT CHANGE UP (ref 7.4–10.4)
POTASSIUM SERPL-MCNC: 4.8 MMOL/L — SIGNIFICANT CHANGE UP (ref 3.5–5)
POTASSIUM SERPL-SCNC: 4.8 MMOL/L — SIGNIFICANT CHANGE UP (ref 3.5–5)
PROT SERPL-MCNC: 7.1 G/DL — SIGNIFICANT CHANGE UP (ref 6–8)
RBC # BLD: 4.68 M/UL — SIGNIFICANT CHANGE UP (ref 4.2–5.4)
RBC # FLD: 14.8 % — HIGH (ref 11.5–14.5)
SODIUM SERPL-SCNC: 138 MMOL/L — SIGNIFICANT CHANGE UP (ref 135–146)
WBC # BLD: 8.4 K/UL — SIGNIFICANT CHANGE UP (ref 4.8–10.8)
WBC # FLD AUTO: 8.4 K/UL — SIGNIFICANT CHANGE UP (ref 4.8–10.8)

## 2023-05-20 PROCEDURE — 80053 COMPREHEN METABOLIC PANEL: CPT

## 2023-05-20 PROCEDURE — 36415 COLL VENOUS BLD VENIPUNCTURE: CPT

## 2023-05-20 PROCEDURE — 84703 CHORIONIC GONADOTROPIN ASSAY: CPT

## 2023-05-20 PROCEDURE — 85025 COMPLETE CBC W/AUTO DIFF WBC: CPT

## 2023-05-20 PROCEDURE — 70487 CT MAXILLOFACIAL W/DYE: CPT | Mod: 26,MA

## 2023-05-20 PROCEDURE — 96374 THER/PROPH/DIAG INJ IV PUSH: CPT | Mod: XU

## 2023-05-20 PROCEDURE — 70487 CT MAXILLOFACIAL W/DYE: CPT | Mod: MA

## 2023-05-20 PROCEDURE — 99284 EMERGENCY DEPT VISIT MOD MDM: CPT | Mod: 25

## 2023-05-20 PROCEDURE — 99284 EMERGENCY DEPT VISIT MOD MDM: CPT

## 2023-05-20 RX ORDER — AMPICILLIN SODIUM AND SULBACTAM SODIUM 250; 125 MG/ML; MG/ML
1.5 INJECTION, POWDER, FOR SUSPENSION INTRAMUSCULAR; INTRAVENOUS ONCE
Refills: 0 | Status: COMPLETED | OUTPATIENT
Start: 2023-05-20 | End: 2023-05-20

## 2023-05-20 RX ADMIN — AMPICILLIN SODIUM AND SULBACTAM SODIUM 200 GRAM(S): 250; 125 INJECTION, POWDER, FOR SUSPENSION INTRAMUSCULAR; INTRAVENOUS at 11:49

## 2023-05-20 NOTE — ED PROVIDER NOTE - NSFOLLOWUPCLINICS_GEN_ALL_ED_FT
[facility]  Dental  [address] [place] [id number]  Phone: [phone]  Fax:   Follow Up Time: 1-3 Days

## 2023-05-20 NOTE — ED PROVIDER NOTE - PATIENT PORTAL LINK FT
You can access the FollowMyHealth Patient Portal offered by [facility] by registering at the following website: [email or web address] By joining [facility]’s FollowMyHealth portal, you will also be able to view your health information using other applications (apps) compatible with our system.

## 2023-05-20 NOTE — ED ADULT NURSE NOTE - OBJECTIVE STATEMENT
Patient is a 32 year old female complaining of right sided facial swelling since yesterday- as per patient she had two teeth extracted yesterday

## 2023-05-20 NOTE — ED PROVIDER NOTE - CLINICAL SUMMARY MEDICAL DECISION MAKING FREE TEXT BOX
32-year-old female with right-sided facial swelling following extraction of 2 teeth yesterday.  Patient appears well, airway is patent.  Labs ordered and reviewed, dose of antibiotics given in ED.  Imaging ordered, no discrete abscess seen, findings suggestive of post-extraction changes.  Patient is stable for discharge home, advised to continue taking Antibiotics, strict return precautions given.  Patient verbalized understanding and is amenable with the plan.

## 2023-05-20 NOTE — ED PROVIDER NOTE - ATTENDING CONTRIBUTION TO CARE
32-year-old female presenting for evaluation of right-sided facial swelling for the past 3 days.  Patient states that yesterday had 2 teeth extracted by her dentist, was given oral antibiotics and sent home.  This morning she woke up with worsening swelling in her face called her dentist and was reportedly told to come to the emergency room.  She denies any fever or chills, no visual disturbances, no difficulty swallowing or breathing or any other additional complaints.  Well-appearing elderly female in no acute distress PERRL, pink conjunctiva, MMM normal floor of the mouth, no palpable dental abscess, normal phonation without drooling or trismus, patent airway, there is diffuse swelling of the rt cheek/face without palpable crepitus or facial cellulitis, patient speaking full sentences lungs clear to auscultation bilaterally, abdomen soft/NT/ND, patient awake and alert x3, no focal neuro deficits.  Patient to antibiotic and pain medication before coming to the emergency room.  Plan: Labs, facial imaging r/o abscess, abx, reassess.  Patient is amenable to plan.

## 2023-05-20 NOTE — ED ADULT TRIAGE NOTE - CHIEF COMPLAINT QUOTE
pt c/o right facial swelling after 2 teeth extractions yesterday saw dentist who sent pt in for IVAB ; pt has obvious swelling on right side of face up to right orbit; pt already taking PO antibiotics already

## 2023-05-20 NOTE — ED PROVIDER NOTE - OBJECTIVE STATEMENT
31 yo female, no PMHx, presents with right facial swelling x3 days, worse this morning, no alleviating factors, associated with mild soreness. Patient had dental extraction yesterday and took a dose of Amoxicillin but woke up with more swelling. Denies fevers, chills, nausea, vomiting, headache, dizziness.

## 2024-04-02 NOTE — ED PROVIDER NOTE - PHYSICAL EXAMINATION
yes CONSTITUTIONAL: Well-developed; well-nourished; in no acute distress.   SKIN: warm, dry  HEAD: Normocephalic; +right facial swelling from below eye to right mandible  ENT: No nasal discharge; airway clear. +extracted tooth #14 15 without active bleeding or surrounding abscess  NECK: Supple; non tender.  CARD: S1, S2 normal; Regular rate and rhythm.   RESP: No wheezes, rales or rhonchi.  ABD: soft ntnd  EXT: Normal ROM.  No clubbing, cyanosis or edema.   NEURO: Alert, oriented, grossly unremarkable  PSYCH: Cooperative, appropriate. no